# Patient Record
Sex: FEMALE | Race: WHITE | NOT HISPANIC OR LATINO | Employment: FULL TIME | URBAN - METROPOLITAN AREA
[De-identification: names, ages, dates, MRNs, and addresses within clinical notes are randomized per-mention and may not be internally consistent; named-entity substitution may affect disease eponyms.]

---

## 2017-09-25 ENCOUNTER — HOSPITAL ENCOUNTER (EMERGENCY)
Facility: HOSPITAL | Age: 47
Discharge: HOME | End: 2017-09-25
Attending: FAMILY MEDICINE | Admitting: FAMILY MEDICINE

## 2017-09-25 VITALS
HEIGHT: 64 IN | HEART RATE: 104 BPM | DIASTOLIC BLOOD PRESSURE: 88 MMHG | BODY MASS INDEX: 18.78 KG/M2 | RESPIRATION RATE: 19 BRPM | WEIGHT: 110 LBS | SYSTOLIC BLOOD PRESSURE: 126 MMHG | OXYGEN SATURATION: 100 % | TEMPERATURE: 97.2 F

## 2017-09-25 DIAGNOSIS — H16.001 CORNEAL ULCER, RIGHT: Primary | ICD-10-CM

## 2017-09-25 DIAGNOSIS — H57.89 CONTACT LENS STUCK: ICD-10-CM

## 2017-09-25 PROCEDURE — 99283 EMERGENCY DEPT VISIT LOW MDM: CPT

## 2017-09-25 RX ORDER — TETRACAINE HYDROCHLORIDE 5 MG/ML
2 SOLUTION OPHTHALMIC ONCE
Status: COMPLETED | OUTPATIENT
Start: 2017-09-25 | End: 2017-09-25

## 2017-09-25 RX ADMIN — TETRACAINE HYDROCHLORIDE 2 DROP: 5 SOLUTION OPHTHALMIC at 12:26

## 2017-09-25 RX ADMIN — FLUORESCEIN SODIUM 1 STRIP: 1 STRIP OPHTHALMIC at 12:26

## 2018-10-03 ENCOUNTER — HOSPITAL ENCOUNTER (EMERGENCY)
Facility: HOSPITAL | Age: 48
Discharge: HOME | End: 2018-10-03
Attending: EMERGENCY MEDICINE | Admitting: EMERGENCY MEDICINE

## 2018-10-03 VITALS
HEART RATE: 98 BPM | SYSTOLIC BLOOD PRESSURE: 133 MMHG | DIASTOLIC BLOOD PRESSURE: 81 MMHG | RESPIRATION RATE: 18 BRPM | HEIGHT: 63 IN | TEMPERATURE: 98.2 F | OXYGEN SATURATION: 99 %

## 2018-10-03 DIAGNOSIS — J34.89 NASAL SEPTUM PERFORATION: Primary | ICD-10-CM

## 2018-10-03 DIAGNOSIS — J98.01 BRONCHOSPASM: ICD-10-CM

## 2018-10-03 PROCEDURE — 99282 EMERGENCY DEPT VISIT SF MDM: CPT

## 2018-10-03 RX ORDER — ALBUTEROL SULFATE 90 UG/1
2 AEROSOL, METERED RESPIRATORY (INHALATION) EVERY 4 HOURS PRN
Qty: 1 INHALER | Refills: 0 | Status: SHIPPED | OUTPATIENT
Start: 2018-10-03 | End: 2019-10-23 | Stop reason: SDUPTHER

## 2018-10-03 NOTE — ED PROVIDER NOTES
EMERGENCY DEPARTMENT ENCOUNTER    CHIEF COMPLAINT  Chief Complaint: Difficulty Breathing   History given by: Pt  History limited by: none  Room Number: 40/40  PMD: Melody Mary MD      HPI:  Pt is a 48 y.o. female who presents complaining of coughing and choking spells.  This is been going on for some time, however she seems to think it always starts with a cough.  Sometimes she'll have issues after eating certain types of food. Pt confirms cough productive of thick/clear mucus, ear pain, headache.  Pt has a cocaine addiction, but states that she has been clean for 6 months.     Duration:  Couple days ago   Onset: gradual   Timing: intermittent episodes   Location: throat, ear, head  Radiation: none  Quality: pain  Intensity/Severity: moderate  Progression: unchanged   Associated Symptoms: clear cough, ear pain, headache, thick clear mucus  Aggravating Factors: Cough initiates the difficulty of breathing.   Alleviating Factors: none  Previous Episodes: none stated  Treatment before arrival: none stated     PAST MEDICAL HISTORY  Active Ambulatory Problems     Diagnosis Date Noted   • No Active Ambulatory Problems     Resolved Ambulatory Problems     Diagnosis Date Noted   • No Resolved Ambulatory Problems     Past Medical History:   Diagnosis Date   • Hyperlipidemia        PAST SURGICAL HISTORY  Past Surgical History:   Procedure Laterality Date   • HYSTERECTOMY     • LAPAROTOMY OOPHERECTOMY Right        FAMILY HISTORY  History reviewed. No pertinent family history.    SOCIAL HISTORY  Social History     Social History   • Marital status:      Spouse name: N/A   • Number of children: N/A   • Years of education: N/A     Occupational History   • Not on file.     Social History Main Topics   • Smoking status: Current Every Day Smoker     Packs/day: 1.00     Types: Cigarettes   • Smokeless tobacco: Not on file   • Alcohol use Yes      Comment: social   • Drug use: Yes     Types: Cocaine      Comment: clean  from cocaine x6 months   • Sexual activity: Not on file     Other Topics Concern   • Not on file     Social History Narrative   • No narrative on file       ALLERGIES  Morphine and related    REVIEW OF SYSTEMS  Review of Systems   Constitutional: Negative for fever.   HENT: Positive for ear pain. Negative for sore throat.         Thick, clear mucus.    Eyes: Negative.    Respiratory: Positive for cough. Negative for shortness of breath.         Difficulty breathing.    Cardiovascular: Negative for chest pain.   Gastrointestinal: Negative for abdominal pain, diarrhea and vomiting.   Genitourinary: Negative for dysuria.   Musculoskeletal: Negative for neck pain.   Skin: Negative for rash.   Allergic/Immunologic: Negative.    Neurological: Positive for headaches. Negative for weakness and numbness.   Hematological: Negative.    Psychiatric/Behavioral: Negative.    All other systems reviewed and are negative.      PHYSICAL EXAM  ED Triage Vitals   Temp Heart Rate Resp BP SpO2   10/03/18 1320 10/03/18 1320 10/03/18 1320 10/03/18 1330 10/03/18 1320   98.2 °F (36.8 °C) 108 18 133/81 99 %      Temp src Heart Rate Source Patient Position BP Location FiO2 (%)   -- -- -- -- --              Physical Exam   Constitutional: She is oriented to person, place, and time. No distress.   HENT:   Head: Normocephalic and atraumatic.   Right Ear: External ear normal.   Left Ear: External ear normal.   Mouth/Throat: Oropharynx is clear and moist.   Chronic appearing nasal septum perforation.       Eyes: Pupils are equal, round, and reactive to light. EOM are normal.   Neck: Normal range of motion. Neck supple.   Cardiovascular: Normal rate, regular rhythm and normal heart sounds.    Pulmonary/Chest: Effort normal and breath sounds normal. No respiratory distress.   Abdominal: Soft. There is no tenderness. There is no rebound and no guarding.   Musculoskeletal: Normal range of motion. She exhibits no edema.   Neurological: She is alert and  oriented to person, place, and time. She has normal sensation and normal strength.   Skin: Skin is warm and dry. No rash noted.   Psychiatric: Mood and affect normal.   Nursing note and vitals reviewed.          PROCEDURES  Procedures      PROGRESS AND CONSULTS  ED Course as of Oct 03 1417   Wed Oct 03, 2018   1346 Nasal pain since dec 2016  [KG]      ED Course User Index  [KG] Nisha Lauren, APRN     1411  She has a chronic appearing nasal septal perforation.  She also has symptoms of chronic sinusitis-I have advised her to start using over-the-counter Flonase.  I'll also prescribe her albuterol for bronchospasm.  She will need a follow-up with ENT to further discuss treatment options for the nasal septal perforation.  Discussed plan to discharge pt. Pt understands and agrees with the plan, all questions answered.        MEDICAL DECISION MAKING  Results were reviewed/discussed with the patient and they were also made aware of online access. Pt also made aware that some labs, such as cultures, will not be resulted during ER visit and follow up with PMD is necessary.     MDM  Number of Diagnoses or Management Options  Bronchospasm:   Nasal septum perforation:      Amount and/or Complexity of Data Reviewed  Decide to obtain previous medical records or to obtain history from someone other than the patient: yes  Review and summarize past medical records: yes           DIAGNOSIS  Final diagnoses:   Nasal septum perforation   Bronchospasm       DISPOSITION  DISCHARGE    Patient discharged in stable condition.    Reviewed implications of results, diagnosis, meds, responsibility to follow up, warning signs and symptoms of possible worsening, potential complications and reasons to return to ER.    Patient/Family voiced understanding of above instructions.    Discussed plan for discharge, as there is no emergent indication for admission. Patient referred to primary care provider for BP management due to today's BP.  Pt/family is agreeable and understands need for follow up and repeat testing.  Pt is aware that discharge does not mean that nothing is wrong but it indicates no emergency is present that requires admission and they must continue care with follow-up as given below or physician of their choice.     FOLLOW-UP  Leeroy Roche MD  [address]  [facility] [id number] [phone]    Schedule an appointment as soon as possible for a visit       Melody Mary MD  [address] [facility] [id number] [phone]    Schedule an appointment as soon as possible for a visit            Medication List      New Prescriptions    albuterol 108 (90 Base) MCG/ACT inhaler  Commonly known as:  PROVENTIL HFA;VENTOLIN HFA  Inhale 2 puffs Every 4 (Four) Hours As Needed for Wheezing.              Latest Documented Vital Signs:  As of 2:17 PM  BP- 133/81 HR- 98 Temp- 98.2 °F (36.8 °C) O2 sat- 99%    --  Documentation assistance provided by maureen Plascencia for Dr Garsia.  Information recorded by the scribe was done at my direction and has been verified and validated by me.     Luanne Candelario  10/03/18 1412       Osiel Garsia MD  10/03/18 [id number]

## 2018-10-13 ENCOUNTER — HOSPITAL ENCOUNTER (EMERGENCY)
Facility: HOSPITAL | Age: 48
Discharge: HOME | End: 2018-10-13
Attending: EMERGENCY MEDICINE | Admitting: EMERGENCY MEDICINE

## 2018-10-13 ENCOUNTER — APPOINTMENT (OUTPATIENT)
Dept: GENERAL RADIOLOGY | Facility: HOSPITAL | Age: 48
End: 2018-10-13

## 2018-10-13 VITALS
OXYGEN SATURATION: 94 % | TEMPERATURE: 99 F | DIASTOLIC BLOOD PRESSURE: 74 MMHG | SYSTOLIC BLOOD PRESSURE: 114 MMHG | WEIGHT: 178.2 LBS | HEIGHT: 62 IN | RESPIRATION RATE: 18 BRPM | HEART RATE: 90 BPM | BODY MASS INDEX: 32.79 KG/M2

## 2018-10-13 DIAGNOSIS — J06.9 UPPER RESPIRATORY TRACT INFECTION, UNSPECIFIED TYPE: ICD-10-CM

## 2018-10-13 DIAGNOSIS — J20.9 ACUTE BRONCHITIS WITH BRONCHOSPASM: Primary | ICD-10-CM

## 2018-10-13 LAB
ALBUMIN SERPL-MCNC: 4.3 G/DL (ref 3.5–5.2)
ALBUMIN/GLOB SERPL: 1.3 G/DL
ALP SERPL-CCNC: 123 U/L (ref 39–117)
ALT SERPL W P-5'-P-CCNC: 57 U/L (ref 1–33)
ANION GAP SERPL CALCULATED.3IONS-SCNC: 15.7 MMOL/L
AST SERPL-CCNC: 91 U/L (ref 1–32)
BASOPHILS # BLD AUTO: 0.03 10*3/MM3 (ref 0–0.2)
BASOPHILS NFR BLD AUTO: 0.3 % (ref 0–1.5)
BILIRUB SERPL-MCNC: 0.3 MG/DL (ref 0.1–1.2)
BUN BLD-MCNC: 10 MG/DL (ref 6–20)
BUN/CREAT SERPL: 10.9 (ref 7–25)
CALCIUM SPEC-SCNC: 8.9 MG/DL (ref 8.6–10.5)
CHLORIDE SERPL-SCNC: 96 MMOL/L (ref 98–107)
CO2 SERPL-SCNC: 23.3 MMOL/L (ref 22–29)
CREAT BLD-MCNC: 0.92 MG/DL (ref 0.57–1)
DEPRECATED RDW RBC AUTO: 44.7 FL (ref 37–54)
EOSINOPHIL # BLD AUTO: 0 10*3/MM3 (ref 0–0.7)
EOSINOPHIL NFR BLD AUTO: 0 % (ref 0.3–6.2)
ERYTHROCYTE [DISTWIDTH] IN BLOOD BY AUTOMATED COUNT: 13.5 % (ref 11.7–13)
FLUAV AG NPH QL: NEGATIVE
FLUBV AG NPH QL IA: NEGATIVE
GFR SERPL CREATININE-BSD FRML MDRD: 65 ML/MIN/1.73
GLOBULIN UR ELPH-MCNC: 3.3 GM/DL
GLUCOSE BLD-MCNC: 107 MG/DL (ref 65–99)
HCT VFR BLD AUTO: 38.3 % (ref 35.6–45.5)
HGB BLD-MCNC: 13.2 G/DL (ref 11.9–15.5)
IMM GRANULOCYTES # BLD: 0.04 10*3/MM3 (ref 0–0.03)
IMM GRANULOCYTES NFR BLD: 0.4 % (ref 0–0.5)
LYMPHOCYTES # BLD AUTO: 1.4 10*3/MM3 (ref 0.9–4.8)
LYMPHOCYTES NFR BLD AUTO: 15.3 % (ref 19.6–45.3)
MCH RBC QN AUTO: 31.1 PG (ref 26.9–32)
MCHC RBC AUTO-ENTMCNC: 34.5 G/DL (ref 32.4–36.3)
MCV RBC AUTO: 90.1 FL (ref 80.5–98.2)
MONOCYTES # BLD AUTO: 0.48 10*3/MM3 (ref 0.2–1.2)
MONOCYTES NFR BLD AUTO: 5.2 % (ref 5–12)
NEUTROPHILS # BLD AUTO: 7.25 10*3/MM3 (ref 1.9–8.1)
NEUTROPHILS NFR BLD AUTO: 79.2 % (ref 42.7–76)
PLATELET # BLD AUTO: 271 10*3/MM3 (ref 140–500)
PMV BLD AUTO: 9.9 FL (ref 6–12)
POTASSIUM BLD-SCNC: 3.4 MMOL/L (ref 3.5–5.2)
PROT SERPL-MCNC: 7.6 G/DL (ref 6–8.5)
RBC # BLD AUTO: 4.25 10*6/MM3 (ref 3.9–5.2)
SODIUM BLD-SCNC: 135 MMOL/L (ref 136–145)
WBC NRBC COR # BLD: 9.16 10*3/MM3 (ref 4.5–10.7)

## 2018-10-13 PROCEDURE — 87804 INFLUENZA ASSAY W/OPTIC: CPT | Performed by: NURSE PRACTITIONER

## 2018-10-13 PROCEDURE — 85025 COMPLETE CBC W/AUTO DIFF WBC: CPT | Performed by: NURSE PRACTITIONER

## 2018-10-13 PROCEDURE — 99284 EMERGENCY DEPT VISIT MOD MDM: CPT

## 2018-10-13 PROCEDURE — 80053 COMPREHEN METABOLIC PANEL: CPT | Performed by: NURSE PRACTITIONER

## 2018-10-13 PROCEDURE — 71046 X-RAY EXAM CHEST 2 VIEWS: CPT

## 2018-10-13 PROCEDURE — 94799 UNLISTED PULMONARY SVC/PX: CPT

## 2018-10-13 PROCEDURE — 94640 AIRWAY INHALATION TREATMENT: CPT

## 2018-10-13 RX ORDER — METHYLPREDNISOLONE SODIUM SUCCINATE 125 MG/2ML
125 INJECTION, POWDER, LYOPHILIZED, FOR SOLUTION INTRAMUSCULAR; INTRAVENOUS ONCE
Status: DISCONTINUED | OUTPATIENT
Start: 2018-10-13 | End: 2018-10-13

## 2018-10-13 RX ORDER — HYDROXYZINE HYDROCHLORIDE 25 MG/1
25 TABLET, FILM COATED ORAL EVERY 6 HOURS PRN
COMMUNITY
End: 2019-10-23 | Stop reason: SDUPTHER

## 2018-10-13 RX ORDER — LORAZEPAM 0.5 MG/1
0.5 TABLET ORAL NIGHTLY
COMMUNITY
End: 2019-10-23

## 2018-10-13 RX ORDER — PREDNISONE 20 MG/1
20 TABLET ORAL 2 TIMES DAILY
Qty: 10 TABLET | Refills: 0 | Status: SHIPPED | OUTPATIENT
Start: 2018-10-13 | End: 2019-10-23

## 2018-10-13 RX ORDER — IPRATROPIUM BROMIDE AND ALBUTEROL SULFATE 2.5; .5 MG/3ML; MG/3ML
3 SOLUTION RESPIRATORY (INHALATION) ONCE
Status: COMPLETED | OUTPATIENT
Start: 2018-10-13 | End: 2018-10-13

## 2018-10-13 RX ORDER — ESCITALOPRAM OXALATE 20 MG/1
20 TABLET ORAL DAILY
COMMUNITY
End: 2019-10-21 | Stop reason: SDUPTHER

## 2018-10-13 RX ORDER — ALBUTEROL SULFATE 2.5 MG/3ML
2.5 SOLUTION RESPIRATORY (INHALATION) ONCE
Status: COMPLETED | OUTPATIENT
Start: 2018-10-13 | End: 2018-10-13

## 2018-10-13 RX ORDER — IBUPROFEN 600 MG/1
600 TABLET ORAL EVERY 8 HOURS PRN
Qty: 21 TABLET | Refills: 0 | Status: SHIPPED | OUTPATIENT
Start: 2018-10-13 | End: 2019-10-23

## 2018-10-13 RX ADMIN — IPRATROPIUM BROMIDE AND ALBUTEROL SULFATE 3 ML: .5; 3 SOLUTION RESPIRATORY (INHALATION) at 15:00

## 2018-10-13 RX ADMIN — ALBUTEROL SULFATE 2.5 MG: 2.5 SOLUTION RESPIRATORY (INHALATION) at 16:38

## 2018-10-13 NOTE — DISCHARGE INSTRUCTIONS
Use medications as directed and continue the home ventolin inhaler.  Use over the counter Mucinex as needed for congestion or cough.

## 2018-10-13 NOTE — ED PROVIDER NOTES
EMERGENCY DEPARTMENT ENCOUNTER    CHIEF COMPLAINT  Chief Complaint: cough  History given by: patient  History limited by: none  Room Number: 18/18  PMD: Melody Mary MD      HPI:  Pt is a 48 y.o. female who presents complaining of persistent cough (white sputum), wheezing, chest wall pain, chills, subjective fever, arthralgias, and myalgias for the past 4 days. She also c/o n/v/d. Pt advised that her mother had the same sx. Pt denies hx of asthma and COPD. Pt is a smoker and has not used cocaine or ETOH for the past 6 months.     Duration:  4 days  Onset: gradual  Timing: persistent  Location: n/a  Radiation: n/a  Quality: productive to nonproductive  Intensity/Severity: moderate  Progression: unchanged  Associated Symptoms: wheezing, chest wall pain, subjective fever, arthralgias, myalgias, n/v/d  Aggravating Factors: none  Alleviating Factors: none  Previous Episodes: none  Treatment before arrival: none    PAST MEDICAL HISTORY  Active Ambulatory Problems     Diagnosis Date Noted   • No Active Ambulatory Problems     Resolved Ambulatory Problems     Diagnosis Date Noted   • No Resolved Ambulatory Problems     Past Medical History:   Diagnosis Date   • Anxiety    • Drug abuse in remission    • Hyperlipidemia        PAST SURGICAL HISTORY  Past Surgical History:   Procedure Laterality Date   • HYSTERECTOMY     • LAPAROTOMY OOPHERECTOMY Right        FAMILY HISTORY  History reviewed. No pertinent family history.    SOCIAL HISTORY  Social History     Social History   • Marital status:      Spouse name: N/A   • Number of children: N/A   • Years of education: N/A     Occupational History   • Not on file.     Social History Main Topics   • Smoking status: Current Every Day Smoker     Packs/day: 1.00     Types: Cigarettes   • Smokeless tobacco: Not on file   • Alcohol use Yes      Comment: social   • Drug use: Yes     Types: Cocaine      Comment: clean from cocaine x6 months   • Sexual activity: Not on file      Other Topics Concern   • Not on file     Social History Narrative   • No narrative on file       ALLERGIES  Morphine and related    REVIEW OF SYSTEMS  Review of Systems   Constitutional: Positive for fever (subjective).   HENT: Negative for sore throat.    Eyes: Negative.    Respiratory: Positive for cough, shortness of breath and wheezing.    Cardiovascular: Negative for chest pain.   Gastrointestinal: Positive for diarrhea, nausea and vomiting. Negative for abdominal pain.   Genitourinary: Negative for dysuria.   Musculoskeletal: Positive for arthralgias and myalgias. Negative for neck pain.   Skin: Negative for rash.   Allergic/Immunologic: Negative.    Neurological: Negative for weakness, numbness and headaches.   Hematological: Negative.    Psychiatric/Behavioral: Negative.    All other systems reviewed and are negative.      PHYSICAL EXAM  ED Triage Vitals   Temp Heart Rate Resp BP SpO2   10/13/18 1422 10/13/18 1423 10/13/18 1500 10/13/18 1517 10/13/18 1423   99.5 °F (37.5 °C) 100 20 134/85 95 %      Temp src Heart Rate Source Patient Position BP Location FiO2 (%)   10/13/18 1422 -- -- -- --   Tympanic           Physical Exam   Constitutional: She is oriented to person, place, and time. She appears distressed (mild).   HENT:   Head: Normocephalic and atraumatic.   Eyes: Pupils are equal, round, and reactive to light. EOM are normal.   Neck: Normal range of motion. Neck supple.   Cardiovascular: Normal rate, regular rhythm and normal heart sounds.    No murmur heard.  Pulmonary/Chest: She is in respiratory distress (mild). She has no wheezes. She has rhonchi (occasional, right base).   92% on RA, chest wall tenderness   Abdominal: Soft. There is no tenderness. There is no rebound and no guarding.   Musculoskeletal: Normal range of motion. She exhibits no edema.   Neurological: She is alert and oriented to person, place, and time. She has normal sensation and normal strength.   Skin: Skin is warm and dry. No  rash noted.   Psychiatric: Mood and affect normal.   Nursing note and vitals reviewed.      LAB RESULTS  Lab Results (last 24 hours)     Procedure Component Value Units Date/Time    Influenza Antigen, Rapid - Swab, Nasopharynx [[id number]]  (Normal) Collected:  10/13/18 1518    Specimen:  Swab from Nasopharynx Updated:  10/13/18 1547     Influenza A Ag, EIA Negative     Influenza B Ag, EIA Negative    CBC & Differential [[id number]] Collected:  10/13/18 1521    Specimen:  Blood Updated:  10/13/18 1551    Narrative:       The following orders were created for panel order CBC & Differential.  Procedure                               Abnormality         Status                     ---------                               -----------         ------                     CBC Auto Differential[[id number]]        Abnormal            Final result                 Please view results for these tests on the individual orders.    Comprehensive Metabolic Panel [[id number]]  (Abnormal) Collected:  10/13/18 1521    Specimen:  Blood Updated:  10/13/18 1608     Glucose 107 (H) mg/dL      BUN 10 mg/dL      Creatinine 0.92 mg/dL      Sodium 135 (L) mmol/L      Potassium 3.4 (L) mmol/L      Chloride 96 (L) mmol/L      CO2 23.3 mmol/L      Calcium 8.9 mg/dL      Total Protein 7.6 g/dL      Albumin 4.30 g/dL      ALT (SGPT) 57 (H) U/L      AST (SGOT) 91 (H) U/L      Alkaline Phosphatase 123 (H) U/L      Total Bilirubin 0.3 mg/dL      eGFR Non African Amer 65 mL/min/1.73      Globulin 3.3 gm/dL      A/G Ratio 1.3 g/dL      BUN/Creatinine Ratio 10.9     Anion Gap 15.7 mmol/L     CBC Auto Differential [[id number]]  (Abnormal) Collected:  10/13/18 1521    Specimen:  Blood Updated:  10/13/18 1551     WBC 9.16 10*3/mm3      RBC 4.25 10*6/mm3      Hemoglobin 13.2 g/dL      Hematocrit 38.3 %      MCV 90.1 fL      MCH 31.1 pg      MCHC 34.5 g/dL      RDW 13.5 (H) %      RDW-SD 44.7 fl      MPV 9.9 fL      Platelets 271 10*3/mm3      Neutrophil % 79.2 (H) %       Lymphocyte % 15.3 (L) %      Monocyte % 5.2 %      Eosinophil % 0.0 (L) %      Basophil % 0.3 %      Immature Grans % 0.4 %      Neutrophils, Absolute 7.25 10*3/mm3      Lymphocytes, Absolute 1.40 10*3/mm3      Monocytes, Absolute 0.48 10*3/mm3      Eosinophils, Absolute 0.00 10*3/mm3      Basophils, Absolute 0.03 10*3/mm3      Immature Grans, Absolute 0.04 (H) 10*3/mm3           I ordered the above labs and reviewed the results    RADIOLOGY  XR Chest 2 View   Final Result   No evidence for acute pulmonary process. Follow-up as   clinical indications persist.       This report was finalized on 10/13/2018 4:07 PM by Dr. Angel Robison M.D.               I ordered the above noted radiological studies. Interpreted by radiologist. Reviewed by me in PACS.       PROCEDURES  Procedures      PROGRESS AND CONSULTS  ED Course as of Oct 13 1716   Sat Oct 13, 2018   1447 Cough chills myalgia x 4 days. +Tobacco user   [JS]      ED Course User Index  [JS] Mony Lara, APRN   1622  Discussed w/ the pt that her imaging shows no signs of PNA and her labs are negative for influenza. Pt told that sx are likely viral and the plan to administer steroids and breathing treatments. Pt advised since receiving the breathing treatment she feels improved, but not baseline.    1649  Observed pt ambulating well within the ED w/o difficulty or distressed.    1702  BP- 134/85 HR- 98 Temp- 99.5 °F (37.5 °C) (Tympanic) O2 sat- 93%  Rechecked the patient who is in NAD and is resting comfortably. Pt advised she would like to abdomen reassessed. Pt was talking while palpating abdomen w/o evidence of pain or distress. Lungs are now CTAB. Discussed w/ pt the plan to d/c w/ her to f/u as needed w/ her PCP. Pt understands and agrees with the plan, all questions answered.    MEDICAL DECISION MAKING  Results were reviewed/discussed with the patient and they were also made aware of online access. Pt also made aware that some labs, such as  cultures, will not be resulted during ER visit and follow up with PMD is necessary.     MDM  Number of Diagnoses or Management Options     Amount and/or Complexity of Data Reviewed  Clinical lab tests: (Negative influenza)  Tests in the radiology section of CPT®: reviewed (CXR-negative)  Independent visualization of images, tracings, or specimens: yes    Patient Progress  Patient progress: stable         DIAGNOSIS  Final diagnoses:   Acute bronchitis with bronchospasm   Upper respiratory tract infection, unspecified type       DISPOSITION  DISCHARGE    Patient discharged in stable condition.    Reviewed implications of results, diagnosis, meds, responsibility to follow up, warning signs and symptoms of possible worsening, potential complications and reasons to return to ER.    Patient/Family voiced understanding of above instructions.    Discussed plan for discharge, as there is no emergent indication for admission. Patient referred to primary care provider for BP management due to today's BP. Pt/family is agreeable and understands need for follow up and repeat testing.  Pt is aware that discharge does not mean that nothing is wrong but it indicates no emergency is present that requires admission and they must continue care with follow-up as given below or physician of their choice.     FOLLOW-UP  Melody Mary MD  [address] [id number] [phone]    Schedule an appointment as soon as possible for a visit            Medication List      New Prescriptions    ibuprofen 600 MG tablet  Commonly known as:  ADVIL,MOTRIN  Take 1 tablet by mouth Every 8 (Eight) Hours As Needed for Moderate Pain .        Changed    * predniSONE 20 MG tablet  Commonly known as:  DELTASONE  Take 1 tablet by mouth Daily.  What changed:  Another medication with the same name was added. Make sure   you understand how and when to take each.     * predniSONE 20 MG tablet  Commonly known as:  DELTASONE  Take 1 tablet by  mouth 2 (Two) Times a Day.  What changed:  You were already taking a medication with the same name,   and this prescription was added. Make sure you understand how and when to   take each.        * This list has 2 medication(s) that are the same as other medications   prescribed for you. Read the directions carefully, and ask your doctor or   other care provider to review them with you.                Latest Documented Vital Signs:  As of 5:16 PM  BP- 114/74 HR- 90 Temp- 99 °F (37.2 °C) (Oral) O2 sat- 94%    --  Documentation assistance provided by maureen Jacobs for Dr. Wadsworth.  Information recorded by the scribe was done at my direction and has been verified and validated by me.     Daisy Jacobs  10/13/18 7556       Nile Wadsworth MD  10/13/18 4536

## 2019-10-16 ENCOUNTER — TELEPHONE (OUTPATIENT)
Dept: FAMILY MEDICINE CLINIC | Facility: CLINIC | Age: 49
End: 2019-10-16

## 2019-10-16 NOTE — TELEPHONE ENCOUNTER
Pt is requesting a refill on the following medication  Lexapro 20mg 1QD  Atorvastatin 20mg 1QD   Hydroxyzine 25mg 1 QID  Mirtazapine 30mg 1QD    Next appt 10/23/19

## 2019-10-21 DIAGNOSIS — F41.9 ANXIETY: Primary | ICD-10-CM

## 2019-10-21 RX ORDER — ESCITALOPRAM OXALATE 20 MG/1
20 TABLET ORAL DAILY
Qty: 30 TABLET | Refills: 0 | Status: SHIPPED | OUTPATIENT
Start: 2019-10-21 | End: 2019-10-23 | Stop reason: SDUPTHER

## 2019-10-23 ENCOUNTER — OFFICE VISIT (OUTPATIENT)
Dept: FAMILY MEDICINE CLINIC | Facility: CLINIC | Age: 49
End: 2019-10-23

## 2019-10-23 VITALS
TEMPERATURE: 98 F | SYSTOLIC BLOOD PRESSURE: 134 MMHG | OXYGEN SATURATION: 95 % | WEIGHT: 185 LBS | HEART RATE: 104 BPM | DIASTOLIC BLOOD PRESSURE: 90 MMHG | BODY MASS INDEX: 31.58 KG/M2 | HEIGHT: 64 IN

## 2019-10-23 DIAGNOSIS — F41.9 ANXIETY: ICD-10-CM

## 2019-10-23 DIAGNOSIS — F41.8 DEPRESSION WITH ANXIETY: ICD-10-CM

## 2019-10-23 DIAGNOSIS — F51.01 PRIMARY INSOMNIA: ICD-10-CM

## 2019-10-23 DIAGNOSIS — J30.1 SEASONAL ALLERGIC RHINITIS DUE TO POLLEN: ICD-10-CM

## 2019-10-23 DIAGNOSIS — E78.2 MIXED HYPERLIPIDEMIA: Primary | ICD-10-CM

## 2019-10-23 PROCEDURE — 99214 OFFICE O/P EST MOD 30 MIN: CPT | Performed by: FAMILY MEDICINE

## 2019-10-23 RX ORDER — ESCITALOPRAM OXALATE 20 MG/1
20 TABLET ORAL DAILY
Qty: 90 TABLET | Refills: 3 | Status: SHIPPED | OUTPATIENT
Start: 2019-10-23 | End: 2021-01-15

## 2019-10-23 RX ORDER — MONTELUKAST SODIUM 10 MG/1
10 TABLET ORAL NIGHTLY
Qty: 90 TABLET | Refills: 1 | Status: SHIPPED | OUTPATIENT
Start: 2019-10-23 | End: 2020-04-22 | Stop reason: SDUPTHER

## 2019-10-23 RX ORDER — ATORVASTATIN CALCIUM 20 MG/1
20 TABLET, FILM COATED ORAL DAILY
Qty: 90 TABLET | Refills: 3 | Status: SHIPPED | OUTPATIENT
Start: 2019-10-23 | End: 2021-01-18 | Stop reason: SDUPTHER

## 2019-10-23 RX ORDER — MIRTAZAPINE 30 MG/1
30 TABLET, FILM COATED ORAL DAILY
Qty: 90 TABLET | Refills: 3 | Status: SHIPPED | OUTPATIENT
Start: 2019-10-23 | End: 2021-01-15

## 2019-10-23 RX ORDER — CETIRIZINE HYDROCHLORIDE 10 MG/1
10 TABLET ORAL DAILY
Qty: 90 TABLET | Refills: 1 | Status: SHIPPED | OUTPATIENT
Start: 2019-10-23 | End: 2020-05-14

## 2019-10-23 RX ORDER — MIRTAZAPINE 30 MG/1
1 TABLET, FILM COATED ORAL DAILY
COMMUNITY
Start: 2019-10-16 | End: 2019-10-23 | Stop reason: SDUPTHER

## 2019-10-23 RX ORDER — HYDROXYZINE HYDROCHLORIDE 25 MG/1
25 TABLET, FILM COATED ORAL EVERY 6 HOURS PRN
Qty: 90 TABLET | Refills: 3 | Status: SHIPPED | OUTPATIENT
Start: 2019-10-23 | End: 2021-01-15

## 2019-10-23 RX ORDER — ALBUTEROL SULFATE 90 UG/1
2 AEROSOL, METERED RESPIRATORY (INHALATION) EVERY 4 HOURS PRN
Qty: 1 INHALER | Refills: 0 | Status: SHIPPED | OUTPATIENT
Start: 2019-10-23 | End: 2020-04-07 | Stop reason: SDUPTHER

## 2019-10-23 NOTE — PROGRESS NOTES
Michelle Williamson is a 49 y.o. female.     Chief Complaint   Patient presents with   • Anxiety     Follow up   • Cough     x 2 weeks        History of Present Illness   Anxiety/depression- moods are good. No SE of med except weight gain. Still seeing therapist.     hld- taking meds and due labs, no muscle aches    Insomnia- Does well if she has her remeron    Drug abuse- has done cocaine in the past. Still clean. For over a year.     Allergies for 2 weeks, wanting to restart meds. Has every fall.     The following portions of the patient's history were reviewed and updated as appropriate: allergies, current medications, past family history, past medical history, past social history, past surgical history and problem list.    Past Medical History:   Diagnosis Date   • Acute bronchitis    • Allergic rhinitis    • Anxiety    • Current every day smoker     smokes 1 to 2 packs per day for 15 years-currently uses other tobacco products   • Depression with anxiety    • Drug abuse in remission ([facility])    • High cholesterol    • History of depression    • Hyperlipidemia    • Insect bite    • Insect bite of leg    • Insomnia    • Nasal septal deviation    • Tobacco use        Past Surgical History:   Procedure Laterality Date   • COLONOSCOPY  2013   • HEMORRHOIDECTOMY     • HYSTERECTOMY     • LAPAROTOMY OOPHERECTOMY Right        Family History   Problem Relation Age of Onset   • Other Mother         Amyloidosis   • Anemia Mother    • Arthritis Mother    • Hyperlipidemia Mother    • Osteoporosis Mother    • Esophageal cancer Father    • Hyperlipidemia Father    • Breast cancer Maternal Grandmother    • Emphysema Maternal Grandfather    • No Known Problems Other        Social History     Socioeconomic History   • Marital status:      Spouse name: Not on file   • Number of children: Not on file   • Years of education: Not on file   • Highest education level: Not on file   Tobacco Use   • Smoking status:  "Current Every Day Smoker     Packs/day: 1.00     Types: Cigarettes   • Smokeless tobacco: Never Used   • Tobacco comment: smokes 1 to 2 packs per day for 15 years, currently uses other tobacco products   Substance and Sexual Activity   • Alcohol use: Yes     Comment: social   • Drug use: Yes     Types: Cocaine     Comment: clean from cocaine x6 months       Review of Systems   Respiratory: Negative for shortness of breath.    Cardiovascular: Negative for chest pain.       Objective   Visit Vitals  /90   Pulse 104   Temp 98 °F (36.7 °C) (Temporal)   Ht 162.6 cm (64\")   Wt 83.9 kg (185 lb)   SpO2 95%   BMI 31.76 kg/m²     Body mass index is 31.76 kg/m².  Physical Exam   Constitutional: She is oriented to person, place, and time. She appears well-developed and well-nourished.   Cardiovascular: Normal rate, regular rhythm, normal heart sounds and intact distal pulses.   Pulmonary/Chest: Effort normal and breath sounds normal.   Musculoskeletal: Normal range of motion. She exhibits no edema.   Neurological: She is alert and oriented to person, place, and time.   Skin: Skin is warm and dry.   Psychiatric: She has a normal mood and affect. Her behavior is normal.         Assessment/Plan   Joelle was seen today for anxiety and cough.    Diagnoses and all orders for this visit:    Mixed hyperlipidemia  -     Lipid Panel  -     Comprehensive Metabolic Panel  -     atorvastatin (LIPITOR) 20 MG tablet; Take 1 tablet by mouth Daily.  -     CBC & Differential    Primary insomnia  -     CBC & Differential  -     mirtazapine (REMERON) 30 MG tablet; Take 1 tablet by mouth Daily.    Depression with anxiety  -     escitalopram (LEXAPRO) 20 MG tablet; Take 1 tablet by mouth Daily.  -     hydrOXYzine (ATARAX) 25 MG tablet; Take 1 tablet by mouth Every 6 (Six) Hours As Needed for Anxiety.    Anxiety    Seasonal allergic rhinitis due to pollen  -     albuterol sulfate  (90 Base) MCG/ACT inhaler; Inhale 2 puffs Every 4 (Four) " Hours As Needed for Wheezing.  -     montelukast (SINGULAIR) 10 MG tablet; Take 1 tablet by mouth Every Night.  -     cetirizine (zyrTEC) 10 MG tablet; Take 1 tablet by mouth Daily.    Cont meds, f/u in 6 months and if worse or no better.

## 2019-10-24 LAB
ALBUMIN SERPL-MCNC: 4.6 G/DL (ref 3.5–5.2)
ALBUMIN/GLOB SERPL: 1.9 G/DL
ALP SERPL-CCNC: 160 U/L (ref 39–117)
ALT SERPL-CCNC: 54 U/L (ref 1–33)
AST SERPL-CCNC: 32 U/L (ref 1–32)
BASOPHILS # BLD AUTO: 0.09 10*3/MM3 (ref 0–0.2)
BASOPHILS NFR BLD AUTO: 1 % (ref 0–1.5)
BILIRUB SERPL-MCNC: 0.5 MG/DL (ref 0.2–1.2)
BUN SERPL-MCNC: 9 MG/DL (ref 6–20)
BUN/CREAT SERPL: 12.7 (ref 7–25)
CALCIUM SERPL-MCNC: 9.6 MG/DL (ref 8.6–10.5)
CHLORIDE SERPL-SCNC: 100 MMOL/L (ref 98–107)
CHOLEST SERPL-MCNC: 219 MG/DL (ref 0–200)
CO2 SERPL-SCNC: 26.7 MMOL/L (ref 22–29)
CREAT SERPL-MCNC: 0.71 MG/DL (ref 0.57–1)
EOSINOPHIL # BLD AUTO: 0.26 10*3/MM3 (ref 0–0.4)
EOSINOPHIL NFR BLD AUTO: 2.8 % (ref 0.3–6.2)
ERYTHROCYTE [DISTWIDTH] IN BLOOD BY AUTOMATED COUNT: 12.9 % (ref 12.3–15.4)
GLOBULIN SER CALC-MCNC: 2.4 GM/DL
GLUCOSE SERPL-MCNC: 127 MG/DL (ref 65–99)
HCT VFR BLD AUTO: 42.7 % (ref 34–46.6)
HDLC SERPL-MCNC: 37 MG/DL (ref 40–60)
HGB BLD-MCNC: 15 G/DL (ref 12–15.9)
IMM GRANULOCYTES # BLD AUTO: 0.06 10*3/MM3 (ref 0–0.05)
IMM GRANULOCYTES NFR BLD AUTO: 0.6 % (ref 0–0.5)
LDLC SERPL CALC-MCNC: 117 MG/DL (ref 0–100)
LYMPHOCYTES # BLD AUTO: 2.35 10*3/MM3 (ref 0.7–3.1)
LYMPHOCYTES NFR BLD AUTO: 25.1 % (ref 19.6–45.3)
MCH RBC QN AUTO: 31.8 PG (ref 26.6–33)
MCHC RBC AUTO-ENTMCNC: 35.1 G/DL (ref 31.5–35.7)
MCV RBC AUTO: 90.5 FL (ref 79–97)
MONOCYTES # BLD AUTO: 0.54 10*3/MM3 (ref 0.1–0.9)
MONOCYTES NFR BLD AUTO: 5.8 % (ref 5–12)
NEUTROPHILS # BLD AUTO: 6.07 10*3/MM3 (ref 1.7–7)
NEUTROPHILS NFR BLD AUTO: 64.7 % (ref 42.7–76)
NRBC BLD AUTO-RTO: 0 /100 WBC (ref 0–0.2)
PLATELET # BLD AUTO: 369 10*3/MM3 (ref 140–450)
POTASSIUM SERPL-SCNC: 4.3 MMOL/L (ref 3.5–5.2)
PROT SERPL-MCNC: 7 G/DL (ref 6–8.5)
RBC # BLD AUTO: 4.72 10*6/MM3 (ref 3.77–5.28)
SODIUM SERPL-SCNC: 141 MMOL/L (ref 136–145)
TRIGL SERPL-MCNC: 325 MG/DL (ref 0–150)
VLDLC SERPL CALC-MCNC: 65 MG/DL
WBC # BLD AUTO: 9.37 10*3/MM3 (ref 3.4–10.8)

## 2019-10-25 DIAGNOSIS — R73.9 HYPERGLYCEMIA: Primary | ICD-10-CM

## 2019-10-30 ENCOUNTER — RESULTS ENCOUNTER (OUTPATIENT)
Dept: FAMILY MEDICINE CLINIC | Facility: CLINIC | Age: 49
End: 2019-10-30

## 2019-10-30 DIAGNOSIS — R73.9 HYPERGLYCEMIA: ICD-10-CM

## 2019-11-13 ENCOUNTER — TELEPHONE (OUTPATIENT)
Dept: FAMILY MEDICINE CLINIC | Facility: CLINIC | Age: 49
End: 2019-11-13

## 2019-11-13 NOTE — TELEPHONE ENCOUNTER
Left message on vm that patient needs to come in for additional test, A1C, call office and make a lab appointment.

## 2019-11-27 ENCOUNTER — TELEPHONE (OUTPATIENT)
Dept: FAMILY MEDICINE CLINIC | Facility: CLINIC | Age: 49
End: 2019-11-27

## 2019-12-17 ENCOUNTER — TELEPHONE (OUTPATIENT)
Dept: FAMILY MEDICINE CLINIC | Facility: CLINIC | Age: 49
End: 2019-12-17

## 2020-04-07 ENCOUNTER — E-VISIT (OUTPATIENT)
Dept: FAMILY MEDICINE CLINIC | Facility: TELEHEALTH | Age: 50
End: 2020-04-07

## 2020-04-07 DIAGNOSIS — J32.9 SINUSITIS, UNSPECIFIED CHRONICITY, UNSPECIFIED LOCATION: ICD-10-CM

## 2020-04-07 DIAGNOSIS — J30.1 SEASONAL ALLERGIC RHINITIS DUE TO POLLEN: ICD-10-CM

## 2020-04-07 DIAGNOSIS — J06.9 ACUTE URI: Primary | ICD-10-CM

## 2020-04-07 PROCEDURE — 99422 OL DIG E/M SVC 11-20 MIN: CPT | Performed by: NURSE PRACTITIONER

## 2020-04-07 RX ORDER — METHYLPREDNISOLONE 4 MG/1
TABLET ORAL
Qty: 21 TABLET | Refills: 0 | Status: SHIPPED | OUTPATIENT
Start: 2020-04-07 | End: 2020-04-22

## 2020-04-07 RX ORDER — ALBUTEROL SULFATE 90 UG/1
2 AEROSOL, METERED RESPIRATORY (INHALATION) EVERY 4 HOURS PRN
Qty: 1 INHALER | Refills: 0 | Status: SHIPPED | OUTPATIENT
Start: 2020-04-07 | End: 2020-04-22 | Stop reason: SDUPTHER

## 2020-04-07 RX ORDER — AMOXICILLIN AND CLAVULANATE POTASSIUM 875; 125 MG/1; MG/1
1 TABLET, FILM COATED ORAL 2 TIMES DAILY
Qty: 14 TABLET | Refills: 0 | Status: SHIPPED | OUTPATIENT
Start: 2020-04-07 | End: 2020-04-14

## 2020-04-07 RX ORDER — BROMPHENIRAMINE MALEATE, PSEUDOEPHEDRINE HYDROCHLORIDE, AND DEXTROMETHORPHAN HYDROBROMIDE 2; 30; 10 MG/5ML; MG/5ML; MG/5ML
10 SYRUP ORAL 4 TIMES DAILY PRN
Qty: 280 ML | Refills: 0 | Status: SHIPPED | OUTPATIENT
Start: 2020-04-07 | End: 2021-01-15

## 2020-04-07 NOTE — PROGRESS NOTES
Joelle Williamson    1970  [id number]    I have reviewed the e-Visit questionnaire and patient's answers, my assessment and plan are as follows:    HPI- Pt reports cough, SOA, HA, earache, sore/scratchy throat, loss of taste/smell x 1 week. Pt has had this in the past and was given an inhaler, steroids, antibiotic and cough medicine. Pt is currently using Singulair and ibuprofen.    Review of Systems -Positive- cough, SOA, HA, earache, sore/scratchy throat, loss of taste/smell  Negative-Fever      Diagnoses and all orders for this visit:    Acute URI  -     brompheniramine-pseudoephedrine-DM 30-2-10 MG/5ML syrup; Take 10 mL by mouth 4 (Four) Times a Day As Needed for Congestion or Cough.    Sinusitis, unspecified chronicity, unspecified location  -     amoxicillin-clavulanate (Augmentin) 875-125 MG per tablet; Take 1 tablet by mouth 2 (Two) Times a Day for 7 days.  -     methylPREDNISolone (MEDROL, EVON,) 4 MG tablet; Take as directed on package instructions.      I have sent several medications to your pharmacy, please take as prescribed  Continue using Albuterol inhaler as needed  Alternate tylenol and motrin for pain and/or fever, stay hydrated and rest.   If symptoms worsen or do not improve follow up with your PCP or nearest urgent care.       Any medications prescribed have been sent electronically to   [address] AT [address] [facility] [address]  - [phone]  - [phone] [address] [place] [id number]  Phone: [phone] Fax: [phone]      I have spent a total of 15 minutes reviewing this chart.     IMAN Finn  04/07/20  1:16 PM

## 2020-04-07 NOTE — PATIENT INSTRUCTIONS
I have sent several medications to your pharmacy, please take as prescribed  Continue using Albuterol inhaler as needed  Alternate tylenol and motrin for pain and/or fever, stay hydrated and rest.   If symptoms worsen or do not improve follow up with your PCP or nearest urgent care.       Sinusitis, Adult  Sinusitis is inflammation of your sinuses. Sinuses are hollow spaces in the bones around your face. Your sinuses are located:  · Around your eyes.  · In the middle of your forehead.  · Behind your nose.  · In your cheekbones.  Mucus normally drains out of your sinuses. When your nasal tissues become inflamed or swollen, mucus can become trapped or blocked. This allows bacteria, viruses, and fungi to grow, which leads to infection. Most infections of the sinuses are caused by a virus.  Sinusitis can develop quickly. It can last for up to 4 weeks (acute) or for more than 12 weeks (chronic). Sinusitis often develops after a cold.  What are the causes?  This condition is caused by anything that creates swelling in the sinuses or stops mucus from draining. This includes:  · Allergies.  · Asthma.  · Infection from bacteria or viruses.  · Deformities or blockages in your nose or sinuses.  · Abnormal growths in the nose (nasal polyps).  · Pollutants, such as chemicals or irritants in the air.  · Infection from fungi (rare).  What increases the risk?  You are more likely to develop this condition if you:  · Have a weak body defense system (immune system).  · Do a lot of swimming or diving.  · Overuse nasal sprays.  · Smoke.  What are the signs or symptoms?  The main symptoms of this condition are pain and a feeling of pressure around the affected sinuses. Other symptoms include:  · Stuffy nose or congestion.  · Thick drainage from your nose.  · Swelling and warmth over the affected sinuses.  · Headache.  · Upper toothache.  · A cough that may get worse at night.  · Extra mucus that collects in the throat or the back of the  nose (postnasal drip).  · Decreased sense of smell and taste.  · Fatigue.  · A fever.  · Sore throat.  · Bad breath.  How is this diagnosed?  This condition is diagnosed based on:  · Your symptoms.  · Your medical history.  · A physical exam.  · Tests to find out if your condition is acute or chronic. This may include:  ? Checking your nose for nasal polyps.  ? Viewing your sinuses using a device that has a light (endoscope).  ? Testing for allergies or bacteria.  ? Imaging tests, such as an MRI or CT scan.  In rare cases, a bone biopsy may be done to rule out more serious types of fungal sinus disease.  How is this treated?  Treatment for sinusitis depends on the cause and whether your condition is chronic or acute.  · If caused by a virus, your symptoms should go away on their own within 10 days. You may be given medicines to relieve symptoms. They include:  ? Medicines that shrink swollen nasal passages (topical intranasal decongestants).  ? Medicines that treat allergies (antihistamines).  ? A spray that eases inflammation of the nostrils (topical intranasal corticosteroids).  ? Rinses that help get rid of thick mucus in your nose (nasal saline washes).  · If caused by bacteria, your health care provider may recommend waiting to see if your symptoms improve. Most bacterial infections will get better without antibiotic medicine. You may be given antibiotics if you have:  ? A severe infection.  ? A weak immune system.  · If caused by narrow nasal passages or nasal polyps, you may need to have surgery.  Follow these instructions at home:  Medicines  · Take, use, or apply over-the-counter and prescription medicines only as told by your health care provider. These may include nasal sprays.  · If you were prescribed an antibiotic medicine, take it as told by your health care provider. Do not stop taking the antibiotic even if you start to feel better.  Hydrate and humidify    · Drink enough fluid to keep your urine  pale yellow. Staying hydrated will help to thin your mucus.  · Use a cool mist humidifier to keep the humidity level in your home above 50%.  · Inhale steam for 10-15 minutes, 3-4 times a day, or as told by your health care provider. You can do this in the bathroom while a hot shower is running.  · Limit your exposure to cool or dry air.  Rest  · Rest as much as possible.  · Sleep with your head raised (elevated).  · Make sure you get enough sleep each night.  General instructions    · Apply a warm, moist washcloth to your face 3-4 times a day or as told by your health care provider. This will help with discomfort.  · Wash your hands often with soap and water to reduce your exposure to germs. If soap and water are not available, use hand .  · Do not smoke. Avoid being around people who are smoking (secondhand smoke).  · Keep all follow-up visits as told by your health care provider. This is important.  Contact a health care provider if:  · You have a fever.  · Your symptoms get worse.  · Your symptoms do not improve within 10 days.  Get help right away if:  · You have a severe headache.  · You have persistent vomiting.  · You have severe pain or swelling around your face or eyes.  · You have vision problems.  · You develop confusion.  · Your neck is stiff.  · You have trouble breathing.  Summary  · Sinusitis is soreness and inflammation of your sinuses. Sinuses are hollow spaces in the bones around your face.  · This condition is caused by nasal tissues that become inflamed or swollen. The swelling traps or blocks the flow of mucus. This allows bacteria, viruses, and fungi to grow, which leads to infection.  · If you were prescribed an antibiotic medicine, take it as told by your health care provider. Do not stop taking the antibiotic even if you start to feel better.  · Keep all follow-up visits as told by your health care provider. This is important.  This information is not intended to replace advice  "given to you by your health care provider. Make sure you discuss any questions you have with your health care provider.  Document Released: 12/18/2006 Document Revised: 05/20/2019 Document Reviewed: 05/20/2019  [employer] Interactive Patient Education © 2020 [employer]  Upper Respiratory Infection, Adult  An upper respiratory infection (URI) affects the nose, throat, and upper air passages. URIs are caused by germs (viruses). The most common type of URI is often called \"the common cold.\"  Medicines cannot cure URIs, but you can do things at home to relieve your symptoms. URIs usually get better within 7-10 days.  Follow these instructions at home:  Activity  · Rest as needed.  · If you have a fever, stay home from work or school until your fever is gone, or until your doctor says you may return to work or school.  ? You should stay home until you cannot spread the infection anymore (you are not contagious).  ? Your doctor may have you wear a face mask so you have less risk of spreading the infection.  Relieving symptoms  · Gargle with a salt-water mixture 3-4 times a day or as needed. To make a salt-water mixture, completely dissolve ½-1 tsp of salt in 1 cup of warm water.  · Use a cool-mist humidifier to add moisture to the air. This can help you breathe more easily.  Eating and drinking    · Drink enough fluid to keep your pee (urine) pale yellow.  · Eat soups and other clear broths.  General instructions    · Take over-the-counter and prescription medicines only as told by your doctor. These include cold medicines, fever reducers, and cough suppressants.  · Do not use any products that contain nicotine or tobacco. These include cigarettes and e-cigarettes. If you need help quitting, ask your doctor.  · Avoid being where people are smoking (avoid secondhand smoke).  · Make sure you get regular shots and get the flu shot every year.  · Keep all follow-up visits as told by your doctor. This is important.  How to " "avoid spreading infection to others    · Wash your hands often with soap and water. If you do not have soap and water, use hand .  · Avoid touching your mouth, face, eyes, or nose.  · Cough or sneeze into a tissue or your sleeve or elbow. Do not cough or sneeze into your hand or into the air.  Contact a doctor if:  · You are getting worse, not better.  · You have any of these:  ? A fever.  ? Chills.  ? Brown or red mucus in your nose.  ? Yellow or brown fluid (discharge)coming from your nose.  ? Pain in your face, especially when you bend forward.  ? Swollen neck glands.  ? Pain with swallowing.  ? White areas in the back of your throat.  Get help right away if:  · You have shortness of breath that gets worse.  · You have very bad or constant:  ? Headache.  ? Ear pain.  ? Pain in your forehead, behind your eyes, and over your cheekbones (sinus pain).  ? Chest pain.  · You have long-lasting (chronic) lung disease along with any of these:  ? Wheezing.  ? Long-lasting cough.  ? Coughing up blood.  ? A change in your usual mucus.  · You have a stiff neck.  · You have changes in your:  ? Vision.  ? Hearing.  ? Thinking.  ? Mood.  Summary  · An upper respiratory infection (URI) is caused by a germ called a virus. The most common type of URI is often called \"the common cold.\"  · URIs usually get better within 7-10 days.  · Take over-the-counter and prescription medicines only as told by your doctor.  This information is not intended to replace advice given to you by your health care provider. Make sure you discuss any questions you have with your health care provider.  Document Released: 06/05/2009 Document Revised: 12/26/2019 Document Reviewed: 08/10/2018  [employer] Interactive Patient Education © 2020 [employer]    "

## 2020-04-22 ENCOUNTER — TELEMEDICINE (OUTPATIENT)
Dept: FAMILY MEDICINE CLINIC | Facility: CLINIC | Age: 50
End: 2020-04-22

## 2020-04-22 DIAGNOSIS — F41.8 DEPRESSION WITH ANXIETY: ICD-10-CM

## 2020-04-22 DIAGNOSIS — J06.9 ACUTE URI: ICD-10-CM

## 2020-04-22 DIAGNOSIS — J30.1 SEASONAL ALLERGIC RHINITIS DUE TO POLLEN: ICD-10-CM

## 2020-04-22 DIAGNOSIS — F19.11 DRUG ABUSE IN REMISSION (HCC): ICD-10-CM

## 2020-04-22 DIAGNOSIS — F51.01 PRIMARY INSOMNIA: Primary | ICD-10-CM

## 2020-04-22 DIAGNOSIS — R73.01 FASTING HYPERGLYCEMIA: ICD-10-CM

## 2020-04-22 DIAGNOSIS — E78.2 MIXED HYPERLIPIDEMIA: ICD-10-CM

## 2020-04-22 PROCEDURE — 99214 OFFICE O/P EST MOD 30 MIN: CPT | Performed by: FAMILY MEDICINE

## 2020-04-22 RX ORDER — ALBUTEROL SULFATE 90 UG/1
2 AEROSOL, METERED RESPIRATORY (INHALATION) EVERY 4 HOURS PRN
Qty: 1 INHALER | Refills: 0 | Status: SHIPPED | OUTPATIENT
Start: 2020-04-22 | End: 2021-03-02 | Stop reason: SDUPTHER

## 2020-04-22 RX ORDER — LEVOFLOXACIN 500 MG/1
500 TABLET, FILM COATED ORAL DAILY
Qty: 10 TABLET | Refills: 0 | Status: SHIPPED | OUTPATIENT
Start: 2020-04-22 | End: 2020-05-14

## 2020-04-22 RX ORDER — MONTELUKAST SODIUM 10 MG/1
10 TABLET ORAL NIGHTLY
Qty: 90 TABLET | Refills: 1 | Status: SHIPPED | OUTPATIENT
Start: 2020-04-22 | End: 2021-01-15

## 2020-04-22 NOTE — PROGRESS NOTES
Michelle Williamson is a 50 y.o. female.     Chief Complaint   Patient presents with   • Hyperlipidemia       History of Present Illness   Anxiety/depression- moods are good. No SE of med except weight gain. Still seeing therapist.      hld- taking meds and due labs, no muscle aches     Insomnia- Does well if she has her remeron     Drug abuse- has done cocaine in the past. Still clean. For over a year.      Allergies stable on meds    Elevated blood sugar on last labs- never came in for a1c    Cough and congestion for 2 weeks and was given abx and improving some but relapsed after abx finished. Lost sense of smell.     The following portions of the patient's history were reviewed and updated as appropriate: allergies, current medications, past family history, past medical history, past social history, past surgical history and problem list.    Past Medical History:   Diagnosis Date   • Acute bronchitis    • Allergic rhinitis    • Anxiety    • Current every day smoker     smokes 1 to 2 packs per day for 15 years-currently uses other tobacco products   • Depression with anxiety    • Drug abuse in remission (CMS/[facility])    • High cholesterol    • History of depression    • Hyperlipidemia    • Insect bite    • Insect bite of leg    • Insomnia    • Nasal septal deviation    • Tobacco use        Past Surgical History:   Procedure Laterality Date   • COLONOSCOPY  2013   • HEMORRHOIDECTOMY     • HYSTERECTOMY     • LAPAROTOMY OOPHERECTOMY Right        Family History   Problem Relation Age of Onset   • Other Mother         Amyloidosis   • Anemia Mother    • Arthritis Mother    • Hyperlipidemia Mother    • Osteoporosis Mother    • Esophageal cancer Father    • Hyperlipidemia Father    • Breast cancer Maternal Grandmother    • Emphysema Maternal Grandfather    • No Known Problems Other        Social History     Socioeconomic History   • Marital status:      Spouse name: Not on file   • Number of children: Not on  file   • Years of education: Not on file   • Highest education level: Not on file   Tobacco Use   • Smoking status: Current Every Day Smoker     Packs/day: 1.00     Types: Cigarettes   • Smokeless tobacco: Never Used   • Tobacco comment: smokes 1 to 2 packs per day for 15 years, currently uses other tobacco products   Substance and Sexual Activity   • Alcohol use: Yes     Comment: social   • Drug use: Yes     Types: Cocaine     Comment: clean from cocaine x6 months       Review of Systems   Constitutional: Negative for fever.   Respiratory: Negative for shortness of breath.        Objective   There were no vitals taken for this visit.  There is no height or weight on file to calculate BMI.  Physical Exam      Assessment/Plan   Joelle was seen today for hyperlipidemia.    Diagnoses and all orders for this visit:    Primary insomnia    Mixed hyperlipidemia  -     Lipid Panel; Future  -     Comprehensive Metabolic Panel; Future    Depression with anxiety    Drug abuse in remission (CMS/HCC)    Seasonal allergic rhinitis due to pollen  -     albuterol sulfate  (90 Base) MCG/ACT inhaler; Inhale 2 puffs Every 4 (Four) Hours As Needed for Wheezing.  -     montelukast (Singulair) 10 MG tablet; Take 1 tablet by mouth Every Night.    Fasting hyperglycemia  -     Hemoglobin A1c; Future    Acute URI  -     QUESTIONNAIRE SERIES    Other orders  -     levoFLOXacin (Levaquin) 500 MG tablet; Take 1 tablet by mouth Daily.        Tried video and it would not work and had to convert to phone. Cont meds, f/u in 6 months. Gave strict ER warnings and self quarantine advice. Will not get labs until out of quarantine.

## 2020-04-27 ENCOUNTER — TELEPHONE (OUTPATIENT)
Dept: FAMILY MEDICINE CLINIC | Facility: CLINIC | Age: 50
End: 2020-04-27

## 2020-04-27 ENCOUNTER — RESULTS ENCOUNTER (OUTPATIENT)
Dept: FAMILY MEDICINE CLINIC | Facility: CLINIC | Age: 50
End: 2020-04-27

## 2020-04-27 DIAGNOSIS — E78.2 MIXED HYPERLIPIDEMIA: ICD-10-CM

## 2020-04-27 DIAGNOSIS — R73.01 FASTING HYPERGLYCEMIA: ICD-10-CM

## 2020-04-27 NOTE — TELEPHONE ENCOUNTER
Spoke with patient and she sounds really bad.  I informed her that she should go to the ER or the the [facility].  She stated that she planned to go to have COVID testing done next week.  I again informed her that she should go to have testing today because she does not want to end up having to stay in he hospital if she develops pneumonia.  Patient stated that her chest and her back is really hurting when she coughs.  Patient is wanting to know if we can send her in some cough medication.  I informed the patient that we really recommend she go to be see.  She still wanted to send a message to ask for cough medication

## 2020-04-27 NOTE — TELEPHONE ENCOUNTER
I had the opportunity to review the med list and/or validate the medication prescribed   Medication: Brompheniramine-Pseudoephedrine  Dose: MG/5ML  How does the patient take the medication? Syrup(oral)  How often does the patient take the medication? 2 teaspoons 4 times a day as needed  Pharmacy Type (local, mail order, specialty): Local  Pharmacy Name: [facility]  Pharmacy Phone number or location (if available): [facility]  Supply Amount (e.g, 30 or 90 days): 30 days      Patient would like callback at [phone] to discuss how long typically takes this virus to run it's course.

## 2020-04-27 NOTE — TELEPHONE ENCOUNTER
No, I don't want to give her something to cover up symptoms. I agree with you and she needs to go to [facility] or ER.

## 2020-05-14 DIAGNOSIS — J30.1 SEASONAL ALLERGIC RHINITIS DUE TO POLLEN: ICD-10-CM

## 2020-05-14 RX ORDER — CETIRIZINE HYDROCHLORIDE 10 MG/1
TABLET ORAL
Qty: 90 TABLET | Refills: 0 | Status: SHIPPED | OUTPATIENT
Start: 2020-05-14 | End: 2021-01-15

## 2020-05-14 RX ORDER — LEVOFLOXACIN 500 MG/1
TABLET, FILM COATED ORAL
Qty: 10 TABLET | Refills: 0 | Status: SHIPPED | OUTPATIENT
Start: 2020-05-14 | End: 2021-01-15

## 2021-01-15 ENCOUNTER — OFFICE VISIT (OUTPATIENT)
Dept: FAMILY MEDICINE CLINIC | Facility: CLINIC | Age: 51
End: 2021-01-15

## 2021-01-15 VITALS
DIASTOLIC BLOOD PRESSURE: 90 MMHG | TEMPERATURE: 97.7 F | SYSTOLIC BLOOD PRESSURE: 134 MMHG | WEIGHT: 161 LBS | OXYGEN SATURATION: 97 % | HEIGHT: 64 IN | BODY MASS INDEX: 27.49 KG/M2 | HEART RATE: 106 BPM

## 2021-01-15 DIAGNOSIS — J30.1 SEASONAL ALLERGIC RHINITIS DUE TO POLLEN: Primary | ICD-10-CM

## 2021-01-15 DIAGNOSIS — F41.8 DEPRESSION WITH ANXIETY: ICD-10-CM

## 2021-01-15 DIAGNOSIS — E78.2 MIXED HYPERLIPIDEMIA: ICD-10-CM

## 2021-01-15 DIAGNOSIS — F51.01 PRIMARY INSOMNIA: ICD-10-CM

## 2021-01-15 DIAGNOSIS — R73.01 FASTING HYPERGLYCEMIA: ICD-10-CM

## 2021-01-15 DIAGNOSIS — F19.11 DRUG ABUSE IN REMISSION (HCC): ICD-10-CM

## 2021-01-15 DIAGNOSIS — B00.9 HSV-1 INFECTION: ICD-10-CM

## 2021-01-15 PROCEDURE — 99214 OFFICE O/P EST MOD 30 MIN: CPT | Performed by: FAMILY MEDICINE

## 2021-01-15 RX ORDER — MIRTAZAPINE 30 MG/1
30 TABLET, FILM COATED ORAL DAILY
Qty: 90 TABLET | Refills: 3 | Status: SHIPPED | OUTPATIENT
Start: 2021-01-15 | End: 2021-03-02 | Stop reason: SDUPTHER

## 2021-01-15 RX ORDER — VALACYCLOVIR HYDROCHLORIDE 1 G/1
2000 TABLET, FILM COATED ORAL 2 TIMES DAILY
Qty: 4 TABLET | Refills: 5 | Status: SHIPPED | OUTPATIENT
Start: 2021-01-15 | End: 2021-03-02 | Stop reason: SDUPTHER

## 2021-01-15 NOTE — PROGRESS NOTES
Michelle Williamson is a 50 y.o. female.     Chief Complaint   Patient presents with   • Pain     below LT armpit - had a spider bite 6 mos ago - swelled back up       History of Present Illness   Anxiety/depression- moods are good. No SE of med except weight gain. Still seeing therapist. Stopped her meds and doing well     hld- not taking meds and due labs, no muscle aches     Insomnia- Does well on med and needs refill.      Drug abuse- has done cocaine in the past. Still clean. For over a year.      Allergies stable on meds    Elevated blood sugar on last labs- never came in for a1c    Has a spot on her left ribcage that she thought was a spider bite. Swelled up 6 months ago and then started to go away. Has swelled back up, always in the same spot. Some tingling. Started as blisters.     The following portions of the patient's history were reviewed and updated as appropriate: allergies, current medications, past family history, past medical history, past social history, past surgical history and problem list.    Past Medical History:   Diagnosis Date   • Acute bronchitis    • Allergic rhinitis    • Anxiety    • Current every day smoker     smokes 1 to 2 packs per day for 15 years-currently uses other tobacco products   • Depression with anxiety    • Drug abuse in remission (CMS/[facility])    • High cholesterol    • History of depression    • Hyperlipidemia    • Insect bite    • Insect bite of leg    • Insomnia    • Nasal septal deviation    • Tobacco use        Past Surgical History:   Procedure Laterality Date   • COLONOSCOPY  2013   • HEMORRHOIDECTOMY     • HYSTERECTOMY     • LAPAROTOMY OOPHERECTOMY Right        Family History   Problem Relation Age of Onset   • Other Mother         Amyloidosis   • Anemia Mother    • Arthritis Mother    • Hyperlipidemia Mother    • Osteoporosis Mother    • Esophageal cancer Father    • Hyperlipidemia Father    • Breast cancer Maternal Grandmother    • Emphysema Maternal  "Grandfather    • No Known Problems Other        Social History     Socioeconomic History   • Marital status:      Spouse name: Not on file   • Number of children: Not on file   • Years of education: Not on file   • Highest education level: Not on file   Tobacco Use   • Smoking status: Current Every Day Smoker     Packs/day: 1.00     Types: Cigarettes   • Smokeless tobacco: Never Used   • Tobacco comment: smokes 1 to 2 packs per day for 15 years, currently uses other tobacco products   Substance and Sexual Activity   • Alcohol use: Yes     Comment: social   • Drug use: Yes     Types: Cocaine(coke)     Comment: clean from cocaine x6 months       Review of Systems   Constitutional: Negative for fever.   Respiratory: Negative for shortness of breath.        Objective   Visit Vitals  /90   Pulse 106   Temp 97.7 °F (36.5 °C) (Infrared)   Ht 162.6 cm (64\")   Wt 73 kg (161 lb)   SpO2 97%   BMI 27.64 kg/m²     Body mass index is 27.64 kg/m².  Physical Exam   Constitutional: She is oriented to person, place, and time. She appears well-developed and well-nourished.   Cardiovascular: Normal rate, regular rhythm and normal heart sounds.   Pulmonary/Chest: Effort normal and breath sounds normal.   Musculoskeletal: Normal range of motion.   Neurological: She is alert and oriented to person, place, and time.   Skin: Skin is warm and dry.   Blisters in clusters on left rib cage.    Psychiatric: Her behavior is normal.         Assessment/Plan   Diagnoses and all orders for this visit:    1. Seasonal allergic rhinitis due to pollen (Primary)    2. Mixed hyperlipidemia  -     Comprehensive Metabolic Panel  -     Lipid Panel    3. Depression with anxiety    4. Primary insomnia  -     mirtazapine (REMERON) 30 MG tablet; Take 1 tablet by mouth Daily.  Dispense: 90 tablet; Refill: 3    5. Drug abuse in remission (CMS/[facility])    6. Fasting hyperglycemia  -     Hemoglobin A1c    7. HSV-1 infection  -     valACYclovir (Valtrex) " 1000 MG tablet; Take 2 tablets by mouth 2 (Two) Times a Day. Start with first sign of an outbreak.  Dispense: 4 tablet; Refill: 5        If cholesterol high, will call statin back in. Cont meds, f/u if worse or no better and will refer to derm. F/U in 6 months.

## 2021-01-16 LAB
ALBUMIN SERPL-MCNC: 4.7 G/DL (ref 3.5–5.2)
ALBUMIN/GLOB SERPL: 1.9 G/DL
ALP SERPL-CCNC: 143 U/L (ref 39–117)
ALT SERPL-CCNC: 65 U/L (ref 1–33)
AST SERPL-CCNC: 32 U/L (ref 1–32)
BILIRUB SERPL-MCNC: 0.5 MG/DL (ref 0–1.2)
BUN SERPL-MCNC: 13 MG/DL (ref 6–20)
BUN/CREAT SERPL: 17.8 (ref 7–25)
CALCIUM SERPL-MCNC: 9.9 MG/DL (ref 8.6–10.5)
CHLORIDE SERPL-SCNC: 98 MMOL/L (ref 98–107)
CHOLEST SERPL-MCNC: 306 MG/DL (ref 0–200)
CO2 SERPL-SCNC: 28.5 MMOL/L (ref 22–29)
CREAT SERPL-MCNC: 0.73 MG/DL (ref 0.57–1)
GLOBULIN SER CALC-MCNC: 2.5 GM/DL
GLUCOSE SERPL-MCNC: 108 MG/DL (ref 65–99)
HBA1C MFR BLD: 6.4 % (ref 4.8–5.6)
HDLC SERPL-MCNC: 69 MG/DL (ref 40–60)
LDLC SERPL CALC-MCNC: 207 MG/DL (ref 0–100)
POTASSIUM SERPL-SCNC: 4.3 MMOL/L (ref 3.5–5.2)
PROT SERPL-MCNC: 7.2 G/DL (ref 6–8.5)
SODIUM SERPL-SCNC: 137 MMOL/L (ref 136–145)
TRIGL SERPL-MCNC: 162 MG/DL (ref 0–150)
VLDLC SERPL CALC-MCNC: 30 MG/DL (ref 5–40)

## 2021-01-18 ENCOUNTER — TELEPHONE (OUTPATIENT)
Dept: FAMILY MEDICINE CLINIC | Facility: CLINIC | Age: 51
End: 2021-01-18

## 2021-01-18 DIAGNOSIS — E78.2 MIXED HYPERLIPIDEMIA: ICD-10-CM

## 2021-01-18 RX ORDER — ATORVASTATIN CALCIUM 20 MG/1
20 TABLET, FILM COATED ORAL DAILY
Qty: 90 TABLET | Refills: 3 | Status: SHIPPED | OUTPATIENT
Start: 2021-01-18 | End: 2021-03-02 | Stop reason: SDUPTHER

## 2021-01-18 NOTE — TELEPHONE ENCOUNTER
lmtcb okay for hub to read    Your cholesterol is very high.  Please go back on medication.  I called it into your [place] pharmacy.  Your blood sugar is very high and almost to the level of diabetes.  Please work hard on diet and exercise or or I fear that you will slip over into diabetes.  Please follow-up in 3 months.  We will need to follow your liver levels and your cholesterol.

## 2021-01-18 NOTE — TELEPHONE ENCOUNTER
----- Message from Melody Mary MD sent at 1/18/2021  8:15 AM EST -----  Your cholesterol is very high.  Please go back on medication.  I called it into your [place] pharmacy.  Your blood sugar is very high and almost to the level of diabetes.  Please work hard on diet and exercise or or I fear that you will slip over into diabetes.  Please follow-up in 3 months.  We will need to follow your liver levels and your cholesterol.

## 2021-03-02 ENCOUNTER — OFFICE VISIT (OUTPATIENT)
Dept: FAMILY MEDICINE CLINIC | Facility: CLINIC | Age: 51
End: 2021-03-02

## 2021-03-02 ENCOUNTER — PRIOR AUTHORIZATION (OUTPATIENT)
Dept: FAMILY MEDICINE CLINIC | Facility: CLINIC | Age: 51
End: 2021-03-02

## 2021-03-02 VITALS
HEART RATE: 90 BPM | SYSTOLIC BLOOD PRESSURE: 129 MMHG | WEIGHT: 162 LBS | HEIGHT: 64 IN | TEMPERATURE: 95.7 F | OXYGEN SATURATION: 94 % | BODY MASS INDEX: 27.66 KG/M2 | DIASTOLIC BLOOD PRESSURE: 88 MMHG

## 2021-03-02 DIAGNOSIS — B00.9 HSV-1 INFECTION: Primary | ICD-10-CM

## 2021-03-02 DIAGNOSIS — F51.01 PRIMARY INSOMNIA: ICD-10-CM

## 2021-03-02 DIAGNOSIS — B86 SCABIES: ICD-10-CM

## 2021-03-02 DIAGNOSIS — J30.1 SEASONAL ALLERGIC RHINITIS DUE TO POLLEN: ICD-10-CM

## 2021-03-02 DIAGNOSIS — F41.8 DEPRESSION WITH ANXIETY: ICD-10-CM

## 2021-03-02 DIAGNOSIS — E78.2 MIXED HYPERLIPIDEMIA: ICD-10-CM

## 2021-03-02 PROCEDURE — 99214 OFFICE O/P EST MOD 30 MIN: CPT | Performed by: FAMILY MEDICINE

## 2021-03-02 RX ORDER — PERMETHRIN 50 MG/G
CREAM TOPICAL ONCE
Qty: 60 G | Refills: 1 | Status: SHIPPED | OUTPATIENT
Start: 2021-03-02 | End: 2021-03-02

## 2021-03-02 RX ORDER — ATORVASTATIN CALCIUM 20 MG/1
20 TABLET, FILM COATED ORAL DAILY
Qty: 90 TABLET | Refills: 3 | Status: SHIPPED | OUTPATIENT
Start: 2021-03-02

## 2021-03-02 RX ORDER — ALBUTEROL SULFATE 90 UG/1
2 AEROSOL, METERED RESPIRATORY (INHALATION) EVERY 4 HOURS PRN
Qty: 18 G | Refills: 1 | Status: SHIPPED | OUTPATIENT
Start: 2021-03-02 | End: 2021-08-05 | Stop reason: SDUPTHER

## 2021-03-02 RX ORDER — MIRTAZAPINE 30 MG/1
30 TABLET, FILM COATED ORAL DAILY
Qty: 90 TABLET | Refills: 3 | Status: SHIPPED | OUTPATIENT
Start: 2021-03-02

## 2021-03-02 RX ORDER — VALACYCLOVIR HYDROCHLORIDE 1 G/1
2000 TABLET, FILM COATED ORAL 2 TIMES DAILY
Qty: 4 TABLET | Refills: 5 | Status: SHIPPED | OUTPATIENT
Start: 2021-03-02

## 2021-03-02 NOTE — PROGRESS NOTES
Michelle Williamson is a 51 y.o. female.     Chief Complaint   Patient presents with   • Insect Bite       History of Present Illness   Has a spot on her left ribcage that she thought was a spider bite. Swelled up 7 months ago and then started to go away. Has swelled back up, always in the same spot. Some tingling. Started as blisters.     Now for a few weeks having very itchy spots on face and around hair line after being exposed to someone with the same issue. Itching worse at night.         The following portions of the patient's history were reviewed and updated as appropriate: allergies, current medications, past family history, past medical history, past social history, past surgical history and problem list.    Past Medical History:   Diagnosis Date   • Acute bronchitis    • Allergic rhinitis    • Anxiety    • Current every day smoker     smokes 1 to 2 packs per day for 15 years-currently uses other tobacco products   • Depression with anxiety    • Drug abuse in remission (CMS/[facility])    • High cholesterol    • History of depression    • Hyperlipidemia    • Insect bite    • Insect bite of leg    • Insomnia    • Nasal septal deviation    • Tobacco use        Past Surgical History:   Procedure Laterality Date   • COLONOSCOPY  2013   • HEMORRHOIDECTOMY     • HYSTERECTOMY     • LAPAROTOMY OOPHERECTOMY Right        Family History   Problem Relation Age of Onset   • Other Mother         Amyloidosis   • Anemia Mother    • Arthritis Mother    • Hyperlipidemia Mother    • Osteoporosis Mother    • Esophageal cancer Father    • Hyperlipidemia Father    • Breast cancer Maternal Grandmother    • Emphysema Maternal Grandfather    • No Known Problems Other        Social History     Socioeconomic History   • Marital status:      Spouse name: Not on file   • Number of children: Not on file   • Years of education: Not on file   • Highest education level: Not on file   Tobacco Use   • Smoking status: Current Every  "Day Smoker     Packs/day: 1.00     Types: Cigarettes   • Smokeless tobacco: Never Used   • Tobacco comment: smokes 1 to 2 packs per day for 15 years, currently uses other tobacco products   Substance and Sexual Activity   • Alcohol use: Yes     Comment: social   • Drug use: Yes     Types: Cocaine(coke)     Comment: clean from cocaine x6 months       Review of Systems   Constitutional: Negative for fever.       Objective   Visit Vitals  /88 (BP Location: Left arm, Patient Position: Sitting)   Pulse 90   Temp 95.7 °F (35.4 °C)   Ht 162.6 cm (64.02\")   Wt 73.5 kg (162 lb)   SpO2 94%   BMI 27.79 kg/m²     Body mass index is 27.79 kg/m².  Physical Exam  Constitutional:       General: She is not in acute distress.     Appearance: Normal appearance.   Skin:     Comments: Small irritated line under left arm that started as blisters but is excoriated now.  In lines around hair line having red insect bites similar to scabies.    Neurological:      General: No focal deficit present.      Mental Status: She is alert and oriented to person, place, and time.   Psychiatric:         Mood and Affect: Mood normal.         Behavior: Behavior normal.           Assessment/Plan   Diagnoses and all orders for this visit:    1. HSV-1 infection (Primary)  -     Ambulatory Referral to Dermatology  -     valACYclovir (Valtrex) 1000 MG tablet; Take 2 tablets by mouth 2 (Two) Times a Day. Start with first sign of an outbreak.  Dispense: 4 tablet; Refill: 5    2. Scabies  -     permethrin (ELIMITE) 5 % cream; Apply  topically to the appropriate area as directed 1 (One) Time for 1 dose.  Dispense: 60 g; Refill: 1    3. Depression with anxiety    4. Mixed hyperlipidemia  -     atorvastatin (LIPITOR) 20 MG tablet; Take 1 tablet by mouth Daily.  Dispense: 90 tablet; Refill: 3    5. Seasonal allergic rhinitis due to pollen  -     albuterol sulfate  (90 Base) MCG/ACT inhaler; Inhale 2 puffs Every 4 (Four) Hours As Needed for Wheezing.  " Dispense: 18 g; Refill: 1    6. Primary insomnia  -     mirtazapine (REMERON) 30 MG tablet; Take 1 tablet by mouth Daily.  Dispense: 90 tablet; Refill: 3        Repeat scabies treatment in 2 weeks.

## 2021-03-24 ENCOUNTER — BULK ORDERING (OUTPATIENT)
Dept: CASE MANAGEMENT | Facility: OTHER | Age: 51
End: 2021-03-24

## 2021-03-24 DIAGNOSIS — Z23 IMMUNIZATION DUE: ICD-10-CM

## 2021-07-26 ENCOUNTER — TELEPHONE (OUTPATIENT)
Dept: FAMILY MEDICINE CLINIC | Facility: CLINIC | Age: 51
End: 2021-07-26

## 2021-07-26 ENCOUNTER — TELEMEDICINE (OUTPATIENT)
Dept: FAMILY MEDICINE CLINIC | Facility: CLINIC | Age: 51
End: 2021-07-26

## 2021-07-26 DIAGNOSIS — B86 SCABIES: Primary | ICD-10-CM

## 2021-07-26 PROCEDURE — 99213 OFFICE O/P EST LOW 20 MIN: CPT | Performed by: FAMILY MEDICINE

## 2021-07-26 RX ORDER — PERMETHRIN 50 MG/G
CREAM TOPICAL ONCE
Qty: 1 EACH | Refills: 1 | Status: SHIPPED | OUTPATIENT
Start: 2021-07-26 | End: 2021-07-26

## 2021-07-26 NOTE — TELEPHONE ENCOUNTER
Caller: Joelle Williamson    Relationship: Self    Best call back number: [phone] (M)    What medication are you requesting: PERMETHRINE CREAM    What are your current symptoms: RASH    How long have you been experiencing symptoms: 5 DAYS    Have you had these symptoms before:    [x] Yes  [] No    Have you been treated for these symptoms before:   [x] Yes  [] No    If a prescription is needed, what is your preferred pharmacy and phone number: [id number] - [facility] [address] - [phone]  - [phone] FX     Additional notes:

## 2021-07-26 NOTE — PROGRESS NOTES
Michelle Williamson is a 51 y.o. female.     Chief Complaint   Patient presents with   • Rash       History of Present Illness   Patient was diagnosed with scabies 4 months ago and was given permethrin cream. Was having itchy bites especially around the hairline. This started after an exposure to someone with the same issue. It resolved and it is now back for one week after doing laundry for this friend.     The following portions of the patient's history were reviewed and updated as appropriate: allergies, current medications, past family history, past medical history, past social history, past surgical history and problem list.    Past Medical History:   Diagnosis Date   • Acute bronchitis    • Allergic rhinitis    • Anxiety    • Current every day smoker     smokes 1 to 2 packs per day for 15 years-currently uses other tobacco products   • Depression with anxiety    • Drug abuse in remission (CMS/[facility])    • High cholesterol    • History of depression    • Hyperlipidemia    • Insect bite    • Insect bite of leg    • Insomnia    • Nasal septal deviation    • Tobacco use        Past Surgical History:   Procedure Laterality Date   • COLONOSCOPY  2013   • HEMORRHOIDECTOMY     • HYSTERECTOMY     • LAPAROTOMY OOPHERECTOMY Right        Family History   Problem Relation Age of Onset   • Other Mother         Amyloidosis   • Anemia Mother    • Arthritis Mother    • Hyperlipidemia Mother    • Osteoporosis Mother    • Esophageal cancer Father    • Hyperlipidemia Father    • Breast cancer Maternal Grandmother    • Emphysema Maternal Grandfather    • No Known Problems Other        Social History     Socioeconomic History   • Marital status:      Spouse name: Not on file   • Number of children: Not on file   • Years of education: Not on file   • Highest education level: Not on file   Tobacco Use   • Smoking status: Current Every Day Smoker     Packs/day: 1.00     Types: Cigarettes   • Smokeless tobacco: Never Used    • Tobacco comment: smokes 1 to 2 packs per day for 15 years, currently uses other tobacco products   Substance and Sexual Activity   • Alcohol use: Yes     Comment: social   • Drug use: Yes     Types: Cocaine(coke)     Comment: clean from cocaine x6 months       Review of Systems   Constitutional: Negative for fever.       Objective   There were no vitals taken for this visit.  There is no height or weight on file to calculate BMI.  Physical Exam  Constitutional:       General: She is not in acute distress.     Appearance: Normal appearance.   Neurological:      General: No focal deficit present.      Mental Status: She is alert and oriented to person, place, and time.   Psychiatric:         Mood and Affect: Mood normal.         Behavior: Behavior normal.           Assessment/Plan   Diagnoses and all orders for this visit:    1. Scabies (Primary)  -     permethrin (ELIMITE) 5 % cream; Apply  topically to the appropriate area as directed 1 (One) Time for 1 dose. And repeat in 2 weeks if still having new lesions.  Dispense: 1 each; Refill: 1      Consent to video visit and spent 8 minutes.  Follow-up if worse or no better.  Discussed environmental changes that will have to be made such as washing the sheets etc.

## 2021-07-26 NOTE — TELEPHONE ENCOUNTER
Needs an appointment.  It can be a video visit.  You can work her on to the end of my day if you need to.  Even if there is not a spot.

## 2021-08-05 ENCOUNTER — TELEMEDICINE (OUTPATIENT)
Dept: FAMILY MEDICINE CLINIC | Facility: CLINIC | Age: 51
End: 2021-08-05

## 2021-08-05 DIAGNOSIS — J30.1 SEASONAL ALLERGIC RHINITIS DUE TO POLLEN: ICD-10-CM

## 2021-08-05 DIAGNOSIS — R05.9 COUGH: Primary | ICD-10-CM

## 2021-08-05 PROCEDURE — 99213 OFFICE O/P EST LOW 20 MIN: CPT | Performed by: FAMILY MEDICINE

## 2021-08-05 RX ORDER — ALBUTEROL SULFATE 90 UG/1
2 AEROSOL, METERED RESPIRATORY (INHALATION) EVERY 4 HOURS PRN
Qty: 18 G | Refills: 1 | Status: SHIPPED | OUTPATIENT
Start: 2021-08-05 | End: 2021-08-27 | Stop reason: SDUPTHER

## 2021-08-05 RX ORDER — AMOXICILLIN 500 MG/1
500 CAPSULE ORAL 3 TIMES DAILY
Qty: 15 CAPSULE | Refills: 0 | Status: SHIPPED | OUTPATIENT
Start: 2021-08-05 | End: 2021-08-10

## 2021-08-05 RX ORDER — METHYLPREDNISOLONE 4 MG/1
TABLET ORAL
Qty: 21 EACH | Refills: 0 | Status: SHIPPED | OUTPATIENT
Start: 2021-08-05 | End: 2021-08-27

## 2021-08-05 NOTE — PROGRESS NOTES
Michelle Williamson is a 51 y.o. female.   Consent given    Time 20 min    Visit via [facility]    CC: cough, congestion, allergies flare up, rib pain , lungs hurt, had fever yesterday      History of Present Illness     cough, congestion, allergies flare up, rib pain , lungs hurt, had fever yesterday  No Covid exposure, but was not vaccinated    The following portions of the patient's history were reviewed and updated as appropriate: allergies, current medications, past family history, past medical history, past social history, past surgical history and problem list.    Past Medical History:   Diagnosis Date   • Acute bronchitis    • Allergic rhinitis    • Anxiety    • Current every day smoker     smokes 1 to 2 packs per day for 15 years-currently uses other tobacco products   • Depression with anxiety    • Drug abuse in remission (CMS/[facility])    • High cholesterol    • History of depression    • Hyperlipidemia    • Insect bite    • Insect bite of leg    • Insomnia    • Nasal septal deviation    • Tobacco use        Past Surgical History:   Procedure Laterality Date   • COLONOSCOPY  2013   • HEMORRHOIDECTOMY     • HYSTERECTOMY     • LAPAROTOMY OOPHERECTOMY Right        Family History   Problem Relation Age of Onset   • Other Mother         Amyloidosis   • Anemia Mother    • Arthritis Mother    • Hyperlipidemia Mother    • Osteoporosis Mother    • Esophageal cancer Father    • Hyperlipidemia Father    • Breast cancer Maternal Grandmother    • Emphysema Maternal Grandfather    • No Known Problems Other        Social History     Socioeconomic History   • Marital status:      Spouse name: Not on file   • Number of children: Not on file   • Years of education: Not on file   • Highest education level: Not on file   Tobacco Use   • Smoking status: Current Every Day Smoker     Packs/day: 1.00     Types: Cigarettes   • Smokeless tobacco: Never Used   • Tobacco comment: smokes 1 to 2 packs per day for 15 years,  currently uses other tobacco products   Substance and Sexual Activity   • Alcohol use: Yes     Comment: social   • Drug use: Yes     Types: Cocaine(coke)     Comment: clean from cocaine x6 months       Current Outpatient Medications on File Prior to Visit   Medication Sig Dispense Refill   • atorvastatin (LIPITOR) 20 MG tablet Take 1 tablet by mouth Daily. 90 tablet 3   • mirtazapine (REMERON) 30 MG tablet Take 1 tablet by mouth Daily. 90 tablet 3   • valACYclovir (Valtrex) 1000 MG tablet Take 2 tablets by mouth 2 (Two) Times a Day. Start with first sign of an outbreak. 4 tablet 5   • [DISCONTINUED] albuterol sulfate  (90 Base) MCG/ACT inhaler Inhale 2 puffs Every 4 (Four) Hours As Needed for Wheezing. 18 g 1     No current facility-administered medications on file prior to visit.       Review of Systems   HENT: Positive for congestion.    Respiratory: Positive for cough.        No results found for this or any previous visit (from the past 4704 hour(s)).  Objective   There were no vitals filed for this visit.  There is no height or weight on file to calculate BMI.  Physical Exam  Constitutional:       Appearance: Normal appearance.   Neurological:      Mental Status: She is alert.           Diagnoses and all orders for this visit:    1. Cough (Primary)  -     amoxicillin (AMOXIL) 500 MG capsule; Take 1 capsule by mouth 3 (Three) Times a Day for 5 days.  Dispense: 15 capsule; Refill: 0  -     albuterol sulfate  (90 Base) MCG/ACT inhaler; Inhale 2 puffs Every 4 (Four) Hours As Needed for Wheezing.  Dispense: 18 g; Refill: 1  -     methylPREDNISolone (MEDROL) 4 MG dose pack; Take as directed on package instructions.  Dispense: 21 each; Refill: 0    2. Seasonal allergic rhinitis due to pollen  -     albuterol sulfate  (90 Base) MCG/ACT inhaler; Inhale 2 puffs Every 4 (Four) Hours As Needed for Wheezing.  Dispense: 18 g; Refill: 1      Return if symptoms worsen or fail to improve.

## 2021-08-27 ENCOUNTER — TELEPHONE (OUTPATIENT)
Dept: FAMILY MEDICINE CLINIC | Facility: CLINIC | Age: 51
End: 2021-08-27

## 2022-01-05 ENCOUNTER — TELEMEDICINE (OUTPATIENT)
Dept: FAMILY MEDICINE CLINIC | Facility: CLINIC | Age: 52
End: 2022-01-05

## 2022-01-05 DIAGNOSIS — J04.0 LARYNGITIS: ICD-10-CM

## 2022-01-05 DIAGNOSIS — R05.9 COUGH: Primary | ICD-10-CM

## 2022-01-05 PROCEDURE — 99214 OFFICE O/P EST MOD 30 MIN: CPT | Performed by: FAMILY MEDICINE

## 2022-01-05 RX ORDER — AZITHROMYCIN 250 MG/1
TABLET, FILM COATED ORAL
Qty: 6 TABLET | Refills: 0 | Status: SHIPPED | OUTPATIENT
Start: 2022-01-05 | End: 2022-02-02

## 2022-01-05 RX ORDER — PREDNISONE 20 MG/1
TABLET ORAL
Qty: 18 TABLET | Refills: 0 | Status: SHIPPED | OUTPATIENT
Start: 2022-01-05 | End: 2022-02-02

## 2022-01-05 NOTE — PROGRESS NOTES
C/o cough and hoarseness.      Michelle Williamson is a 51 y.o. female.     History of Present Illness   Telemedicine visit due to Covid.  Consent obtained.  11-minute visit.  Patient is a 51-year-old female with obesity.  Patient states she has had 2-week history of hoarseness.  She states this is not unusual as she gets this with seasonal change.  4 days ago she developed a cough with rhinorrhea.  She also has developed fever and body aches.  She states she had a family gathering January 1 and ever since then people have been sick.  She has had 2 nephews who were there who have turned positive for Covid.  She has not had any immunizations for Covid.    The following portions of the patient's history were reviewed and updated as appropriate: allergies, current medications, past family history, past medical history, past social history, past surgical history and problem list.    Review of Systems   Constitutional: Positive for fatigue and fever.   HENT: Positive for postnasal drip.    Respiratory: Positive for cough.        Patient Active Problem List   Diagnosis   • Primary insomnia   • Mixed hyperlipidemia   • Drug abuse in remission (HCC)   • Depression with anxiety   • Seasonal allergic rhinitis due to pollen   • Scabies   • Cough   • Laryngitis       Allergies   Allergen Reactions   • Morphine And Related          Current Outpatient Medications:   •  albuterol sulfate  (90 Base) MCG/ACT inhaler, Inhale 2 puffs Every 4 (Four) Hours As Needed for Wheezing., Disp: 18 g, Rfl: 0  •  atorvastatin (LIPITOR) 20 MG tablet, Take 1 tablet by mouth Daily., Disp: 90 tablet, Rfl: 3  •  benzonatate (TESSALON) 100 MG capsule, Take 1-2 capsules by mouth 3 (Three) Times a Day As Needed for Cough., Disp: 30 capsule, Rfl: 0  •  mirtazapine (REMERON) 30 MG tablet, Take 1 tablet by mouth Daily., Disp: 90 tablet, Rfl: 3  •  predniSONE (DELTASONE) 50 MG tablet, Take 1 tablet by mouth Daily With Breakfast., Disp: 5  tablet, Rfl: 0  •  promethazine-dextromethorphan (PROMETHAZINE-DM) 6.25-15 MG/5ML syrup, Take 5 mL by mouth At Night As Needed for Cough., Disp: 118 mL, Rfl: 0  •  valACYclovir (Valtrex) 1000 MG tablet, Take 2 tablets by mouth 2 (Two) Times a Day. Start with first sign of an outbreak., Disp: 4 tablet, Rfl: 5    Past Medical History:   Diagnosis Date   • Acute bronchitis    • Allergic rhinitis    • Anxiety    • Current every day smoker     smokes 1 to 2 packs per day for 15 years-currently uses other tobacco products   • Depression with anxiety    • Drug abuse in remission (CMS/[facility])    • High cholesterol    • History of depression    • Hyperlipidemia    • Insect bite    • Insect bite of leg    • Insomnia    • Nasal septal deviation    • Tobacco use        Past Surgical History:   Procedure Laterality Date   • COLONOSCOPY  2013   • HEMORRHOIDECTOMY     • HYSTERECTOMY     • LAPAROTOMY OOPHERECTOMY Right        Family History   Problem Relation Age of Onset   • Other Mother         Amyloidosis   • Anemia Mother    • Arthritis Mother    • Hyperlipidemia Mother    • Osteoporosis Mother    • Esophageal cancer Father    • Hyperlipidemia Father    • Breast cancer Maternal Grandmother    • Emphysema Maternal Grandfather    • No Known Problems Other        Social History     Tobacco Use   • Smoking status: Current Every Day Smoker     Packs/day: 1.00     Types: Cigarettes   • Smokeless tobacco: Never Used   • Tobacco comment: smokes 1 to 2 packs per day for 15 years, currently uses other tobacco products   Substance Use Topics   • Alcohol use: Yes     Comment: social            Objective     There were no vitals filed for this visit.  There is no height or weight on file to calculate BMI.    Physical Exam  Pulmonary:      Effort: Pulmonary effort is normal.      Comments: Noted to be hoarse.  Neurological:      Mental Status: She is alert and oriented to person, place, and time.   Psychiatric:         Mood and Affect: Mood  normal.         Thought Content: Thought content normal.         Lab Results   Component Value Date    GLUCOSE 108 (H) 01/15/2021    BUN 13 01/15/2021    CREATININE 0.73 01/15/2021    EGFRIFNONA 85 01/15/2021    EGFRIFAFRI 103 01/15/2021    BCR 17.8 01/15/2021    K 4.3 01/15/2021    CO2 28.5 01/15/2021    CALCIUM 9.9 01/15/2021    PROTENTOTREF 7.2 01/15/2021    ALBUMIN 4.70 01/15/2021    LABIL2 1.9 01/15/2021    AST 32 01/15/2021    ALT 65 (H) 01/15/2021       WBC   Date Value Ref Range Status   10/23/2019 9.37 3.40 - 10.80 10*3/mm3 Final     RBC   Date Value Ref Range Status   10/23/2019 4.72 3.77 - 5.28 10*6/mm3 Final     Hemoglobin   Date Value Ref Range Status   10/23/2019 15.0 12.0 - 15.9 g/dL Final   10/13/2018 13.2 11.9 - 15.5 g/dL Final     Hematocrit   Date Value Ref Range Status   10/23/2019 42.7 34.0 - 46.6 % Final   10/13/2018 38.3 35.6 - 45.5 % Final     MCV   Date Value Ref Range Status   10/23/2019 90.5 79.0 - 97.0 fL Final   10/13/2018 90.1 80.5 - 98.2 fL Final     MCH   Date Value Ref Range Status   10/23/2019 31.8 26.6 - 33.0 pg Final   10/13/2018 31.1 26.9 - 32.0 pg Final     MCHC   Date Value Ref Range Status   10/23/2019 35.1 31.5 - 35.7 g/dL Final   10/13/2018 34.5 32.4 - 36.3 g/dL Final     RDW   Date Value Ref Range Status   10/23/2019 12.9 12.3 - 15.4 % Final   10/13/2018 13.5 (H) 11.7 - 13.0 % Final     RDW-SD   Date Value Ref Range Status   10/13/2018 44.7 37.0 - 54.0 fl Final     MPV   Date Value Ref Range Status   10/13/2018 9.9 6.0 - 12.0 fL Final     Platelets   Date Value Ref Range Status   10/23/2019 369 140 - 450 10*3/mm3 Final   10/13/2018 271 140 - 500 10*3/mm3 Final     Neutrophil Rel %   Date Value Ref Range Status   10/23/2019 64.7 42.7 - 76.0 % Final     Neutrophil %   Date Value Ref Range Status   10/13/2018 79.2 (H) 42.7 - 76.0 % Final     Lymphocyte Rel %   Date Value Ref Range Status   10/23/2019 25.1 19.6 - 45.3 % Final     Lymphocyte %   Date Value Ref Range Status    10/13/2018 15.3 (L) 19.6 - 45.3 % Final     Monocyte Rel %   Date Value Ref Range Status   10/23/2019 5.8 5.0 - 12.0 % Final     Monocyte %   Date Value Ref Range Status   10/13/2018 5.2 5.0 - 12.0 % Final     Eosinophil Rel %   Date Value Ref Range Status   10/23/2019 2.8 0.3 - 6.2 % Final     Eosinophil %   Date Value Ref Range Status   10/13/2018 0.0 (L) 0.3 - 6.2 % Final     Basophil Rel %   Date Value Ref Range Status   10/23/2019 1.0 0.0 - 1.5 % Final     Basophil %   Date Value Ref Range Status   10/13/2018 0.3 0.0 - 1.5 % Final     Immature Grans %   Date Value Ref Range Status   10/13/2018 0.4 0.0 - 0.5 % Final     Neutrophils Absolute   Date Value Ref Range Status   10/23/2019 6.07 1.70 - 7.00 10*3/mm3 Final     Neutrophils, Absolute   Date Value Ref Range Status   10/13/2018 7.25 1.90 - 8.10 10*3/mm3 Final     Lymphocytes Absolute   Date Value Ref Range Status   10/23/2019 2.35 0.70 - 3.10 10*3/mm3 Final     Lymphocytes, Absolute   Date Value Ref Range Status   10/13/2018 1.40 0.90 - 4.80 10*3/mm3 Final     Monocytes Absolute   Date Value Ref Range Status   10/23/2019 0.54 0.10 - 0.90 10*3/mm3 Final     Monocytes, Absolute   Date Value Ref Range Status   10/13/2018 0.48 0.20 - 1.20 10*3/mm3 Final     Eosinophils Absolute   Date Value Ref Range Status   10/23/2019 0.26 0.00 - 0.40 10*3/mm3 Final     Eosinophils, Absolute   Date Value Ref Range Status   10/13/2018 0.00 0.00 - 0.70 10*3/mm3 Final     Basophils Absolute   Date Value Ref Range Status   10/23/2019 0.09 0.00 - 0.20 10*3/mm3 Final     Basophils, Absolute   Date Value Ref Range Status   10/13/2018 0.03 0.00 - 0.20 10*3/mm3 Final     Immature Grans, Absolute   Date Value Ref Range Status   10/13/2018 0.04 (H) 0.00 - 0.03 10*3/mm3 Final     nRBC   Date Value Ref Range Status   10/23/2019 0.0 0.0 - 0.2 /100 WBC Final   12/02/2016 0.0 0.0 - 0.0 /100 WBC Final       Lab Results   Component Value Date    HGBA1C 6.40 (H) 01/15/2021       No results  found for: LBSTGIVI27    No results found for: TSH    No results found for: CHOL  Lab Results   Component Value Date    TRIG 162 (H) 01/15/2021     Lab Results   Component Value Date    HDL 69 (H) 01/15/2021     Lab Results   Component Value Date     (H) 01/15/2021     Lab Results   Component Value Date    VLDL 30 01/15/2021     No results found for: LDLHDL      Procedures    Assessment/Plan   Problems Addressed this Visit     Cough - Primary    Laryngitis      Diagnoses       Codes Comments    Cough    -  Primary ICD-10-CM: R05.9  ICD-9-CM: 786.2     Laryngitis     ICD-10-CM: J04.0  ICD-9-CM: 464.00       Cough with fever.  New problem with systemic issues.  Multiple contacts with who have subsequently developed Covid.  Will check Covid status.  Treat for secondary bacterial causes with Z-Cezar.  Laryngitis.  New problem likely viral.  Will treat with p.o. steroids and antibiotics for bacterial coverage.  If worsening breathing to emergency room.  Patient voiced understanding.    No orders of the defined types were placed in this encounter.      Current Outpatient Medications   Medication Sig Dispense Refill   • albuterol sulfate  (90 Base) MCG/ACT inhaler Inhale 2 puffs Every 4 (Four) Hours As Needed for Wheezing. 18 g 0   • atorvastatin (LIPITOR) 20 MG tablet Take 1 tablet by mouth Daily. 90 tablet 3   • benzonatate (TESSALON) 100 MG capsule Take 1-2 capsules by mouth 3 (Three) Times a Day As Needed for Cough. 30 capsule 0   • mirtazapine (REMERON) 30 MG tablet Take 1 tablet by mouth Daily. 90 tablet 3   • predniSONE (DELTASONE) 50 MG tablet Take 1 tablet by mouth Daily With Breakfast. 5 tablet 0   • promethazine-dextromethorphan (PROMETHAZINE-DM) 6.25-15 MG/5ML syrup Take 5 mL by mouth At Night As Needed for Cough. 118 mL 0   • valACYclovir (Valtrex) 1000 MG tablet Take 2 tablets by mouth 2 (Two) Times a Day. Start with first sign of an outbreak. 4 tablet 5     No current facility-administered  medications for this visit.       Joelle Williamson had no medications administered during this visit.    No follow-ups on file.    There are no Patient Instructions on file for this visit.

## 2022-01-26 ENCOUNTER — TELEPHONE (OUTPATIENT)
Dept: FAMILY MEDICINE CLINIC | Facility: CLINIC | Age: 52
End: 2022-01-26

## 2022-01-26 NOTE — TELEPHONE ENCOUNTER
Caller: Joelle Williamson    Relationship: Self    Best call back number: [phone]      What was the call regarding: PATIENT SAW DR. CONTRERAS ON 1/5 AND WAS GIVEN STEROIDS AND ABX. SHE WAS FEELING BETTER BUT NOW SHE IS SICK AGAIN AND WONDERING IF SHE NEEDS ANOTHER ROUND OF MEDICATIONS. SHE ALSO HAS DIARRHEA WITH A LOW GRADE FEVER ALONG WITH THE OTHER SYMPTOMS SHE HAD BEFORE. SHE HAS LOST HER VOICE.     Do you require a callback: YES    [id number] - [place], [place] - [address] [place] [address] PK[address] - [phone] [facility] [phone]   [phone]

## 2022-01-31 ENCOUNTER — TELEPHONE (OUTPATIENT)
Dept: FAMILY MEDICINE CLINIC | Facility: CLINIC | Age: 52
End: 2022-01-31

## 2022-01-31 NOTE — TELEPHONE ENCOUNTER
vm box full....the patient will need an appointment before she can receive any medication     Ok for hub to read

## 2022-01-31 NOTE — TELEPHONE ENCOUNTER
Tried calling pt to let her know she would need an appointment before getting any medication. Vm box was full

## 2022-01-31 NOTE — TELEPHONE ENCOUNTER
PATIENT IS EXPERIENCING THE FOLLOWING SYMPTOMS:  HEADACHE  COUGH  TIGHTNESS IN CHEST DUE TO EXCESSIVE COUGHING AND SMOKING  COLD CHILLS  DIARRHEA  POSSIBLE LARYNGITIS       PATIENT HAS NOT BEEN TESTED FOR COVID.    PATIENT REQUESTING AN ANTIBIOTIC AND STEROID.    PLEASE ADVISE    PHARMACY: [id number] - [place], [place] - [address] - [phone]  - [phone] FX

## 2022-02-17 ENCOUNTER — TELEMEDICINE (OUTPATIENT)
Dept: FAMILY MEDICINE CLINIC | Facility: CLINIC | Age: 52
End: 2022-02-17

## 2022-02-17 DIAGNOSIS — J20.9 ACUTE BRONCHITIS, UNSPECIFIED ORGANISM: Primary | ICD-10-CM

## 2022-02-17 PROCEDURE — 99213 OFFICE O/P EST LOW 20 MIN: CPT | Performed by: NURSE PRACTITIONER

## 2022-02-17 RX ORDER — AMOXICILLIN AND CLAVULANATE POTASSIUM 875; 125 MG/1; MG/1
1 TABLET, FILM COATED ORAL 2 TIMES DAILY
Qty: 14 TABLET | Refills: 0 | Status: SHIPPED | OUTPATIENT
Start: 2022-02-17 | End: 2022-02-24

## 2022-02-17 RX ORDER — ALBUTEROL SULFATE 90 UG/1
2 AEROSOL, METERED RESPIRATORY (INHALATION) EVERY 6 HOURS PRN
Qty: 18 G | Refills: 0 | Status: SHIPPED | OUTPATIENT
Start: 2022-02-17

## 2022-02-17 RX ORDER — BROMPHENIRAMINE MALEATE, PSEUDOEPHEDRINE HYDROCHLORIDE, AND DEXTROMETHORPHAN HYDROBROMIDE 2; 30; 10 MG/5ML; MG/5ML; MG/5ML
10 SYRUP ORAL 4 TIMES DAILY PRN
Qty: 473 ML | Refills: 0 | Status: SHIPPED | OUTPATIENT
Start: 2022-02-17

## 2022-02-17 RX ORDER — DOXYCYCLINE 100 MG/1
100 CAPSULE ORAL 2 TIMES DAILY
Qty: 14 CAPSULE | Refills: 0 | Status: SHIPPED | OUTPATIENT
Start: 2022-02-17 | End: 2022-02-24

## 2022-02-17 NOTE — PROGRESS NOTES
Chief Complaint  Cough  You have chosen to receive care through a telehealth visit.  Do you consent to use a video/audio connection for your medical care today? Yes via [employer]   Michelle          Joelle Williamson presents to [facility]  Cough  The current episode started more than 1 month ago. The problem has been waxing and waning. The cough is productive of sputum. Associated symptoms include ear pain (right), a fever (99.7), headaches, nasal congestion, postnasal drip, a sore throat and shortness of breath. Risk factors for lung disease include smoking/tobacco exposure. Treatments tried: azithromycin and oral steroid. The treatment provided mild relief.     Objective   Vital Signs:   There were no vitals taken for this visit.    Physical Exam  Constitutional:       Appearance: Normal appearance.   Neurological:      Mental Status: She is alert.   Psychiatric:         Mood and Affect: Mood normal.        Result Review :            Assessment and Plan    Diagnoses and all orders for this visit:    1. Acute bronchitis, unspecified organism (Primary)  Comments:  - Patient declines Covid testing at this time.  Orders:  -     XR Chest PA & Lateral; Future  -     amoxicillin-clavulanate (Augmentin) 875-125 MG per tablet; Take 1 tablet by mouth 2 (Two) Times a Day for 7 days.  Dispense: 14 tablet; Refill: 0  -     doxycycline (MONODOX) 100 MG capsule; Take 1 capsule by mouth 2 (Two) Times a Day for 7 days.  Dispense: 14 capsule; Refill: 0  -     brompheniramine-pseudoephedrine-DM 30-2-10 MG/5ML syrup; Take 10 mL by mouth 4 (Four) Times a Day As Needed for Congestion or Cough.  Dispense: 473 mL; Refill: 0  -     albuterol sulfate  (90 Base) MCG/ACT inhaler; Inhale 2 puffs Every 6 (Six) Hours As Needed for Wheezing or Shortness of Air.  Dispense: 18 g; Refill: 0      I spent 20 minutes caring for Joelle on this date of service. This time includes time spent by me in the following  activities:counseling and educating the patient/family/caregiver, ordering medications, tests, or procedures and documenting information in the medical record  Follow Up   Return if symptoms worsen or fail to improve.  Patient was given instructions and counseling regarding her condition or for health maintenance advice. Please see specific information pulled into the AVS if appropriate.

## 2022-04-19 ENCOUNTER — TELEPHONE (OUTPATIENT)
Dept: FAMILY MEDICINE CLINIC | Facility: CLINIC | Age: 52
End: 2022-04-19

## 2024-09-20 ENCOUNTER — OFFICE VISIT (OUTPATIENT)
Dept: FAMILY MEDICINE CLINIC | Facility: CLINIC | Age: 54
End: 2024-09-20
Payer: MEDICAID

## 2024-09-20 VITALS
HEART RATE: 102 BPM | TEMPERATURE: 98.1 F | OXYGEN SATURATION: 97 % | BODY MASS INDEX: 26.12 KG/M2 | DIASTOLIC BLOOD PRESSURE: 90 MMHG | SYSTOLIC BLOOD PRESSURE: 142 MMHG | WEIGHT: 153 LBS | HEIGHT: 64 IN

## 2024-09-20 DIAGNOSIS — R03.0 ELEVATED BP WITHOUT DIAGNOSIS OF HYPERTENSION: ICD-10-CM

## 2024-09-20 DIAGNOSIS — R21 RASH OF FACE: ICD-10-CM

## 2024-09-20 DIAGNOSIS — J02.9 SORE THROAT: ICD-10-CM

## 2024-09-20 DIAGNOSIS — F41.8 DEPRESSION WITH ANXIETY: ICD-10-CM

## 2024-09-20 DIAGNOSIS — Z11.3 ROUTINE SCREENING FOR STI (SEXUALLY TRANSMITTED INFECTION): ICD-10-CM

## 2024-09-20 DIAGNOSIS — N76.0 ACUTE VAGINITIS: ICD-10-CM

## 2024-09-20 DIAGNOSIS — N89.8 VAGINAL LESION: Primary | ICD-10-CM

## 2024-09-20 LAB
EXPIRATION DATE: NORMAL
INTERNAL CONTROL: NORMAL
Lab: NORMAL
S PYO AG THROAT QL: NEGATIVE

## 2024-09-20 RX ORDER — VALACYCLOVIR HYDROCHLORIDE 1 G/1
1000 TABLET, FILM COATED ORAL 2 TIMES DAILY
Qty: 20 TABLET | Refills: 0 | Status: SHIPPED | OUTPATIENT
Start: 2024-09-20 | End: 2024-09-30

## 2024-09-20 RX ORDER — KETOCONAZOLE 20 MG/G
1 CREAM TOPICAL DAILY
Qty: 30 G | Refills: 3 | Status: SHIPPED | OUTPATIENT
Start: 2024-09-20

## 2024-09-20 RX ORDER — FLUCONAZOLE 200 MG/1
200 TABLET ORAL DAILY
Qty: 1 TABLET | Refills: 0 | Status: SHIPPED | OUTPATIENT
Start: 2024-09-20

## 2024-09-20 RX ORDER — PERMETHRIN 50 MG/G
1 CREAM TOPICAL ONCE
Qty: 1 G | Refills: 0 | Status: CANCELLED | OUTPATIENT
Start: 2024-09-20 | End: 2024-09-20

## 2024-09-22 PROBLEM — J02.9 SORE THROAT: Status: ACTIVE | Noted: 2024-09-22

## 2024-09-22 PROBLEM — N89.8 VAGINAL LESION: Status: ACTIVE | Noted: 2024-09-22

## 2024-09-22 PROBLEM — N76.0 ACUTE VAGINITIS: Status: ACTIVE | Noted: 2024-09-22

## 2024-09-22 PROBLEM — R03.0 ELEVATED BP WITHOUT DIAGNOSIS OF HYPERTENSION: Status: ACTIVE | Noted: 2024-09-22

## 2024-09-22 PROBLEM — R21 RASH OF FACE: Status: ACTIVE | Noted: 2024-09-22

## 2024-09-22 PROBLEM — Z11.3 ROUTINE SCREENING FOR STI (SEXUALLY TRANSMITTED INFECTION): Status: ACTIVE | Noted: 2024-09-22

## 2024-09-24 LAB
A VAGINAE DNA VAG QL NAA+PROBE: ABNORMAL SCORE
BVAB2 DNA VAG QL NAA+PROBE: ABNORMAL SCORE
C ALBICANS DNA VAG QL NAA+PROBE: NEGATIVE
C GLABRATA DNA VAG QL NAA+PROBE: NEGATIVE
C TRACH DNA SPEC QL NAA+PROBE: NEGATIVE
HAV IGM SERPL QL IA: NEGATIVE
HBV CORE IGM SERPL QL IA: NEGATIVE
HBV SURFACE AG SERPL QL IA: NEGATIVE
HCV AB SERPL QL IA: NORMAL
HCV IGG SERPL QL IA: NON REACTIVE
HCV RNA SERPL NAA+PROBE-ACNC: NORMAL IU/ML
HIV 1+2 AB+HIV1 P24 AG SERPL QL IA: NON REACTIVE
HSV1 DNA SPEC QL NAA+PROBE: NEGATIVE
HSV1 IGG SER IA-ACNC: 20.5 INDEX (ref 0–0.9)
HSV2 DNA SPEC QL NAA+PROBE: NEGATIVE
HSV2 IGG SER IA-ACNC: <0.91 INDEX (ref 0–0.9)
MEGA1 DNA VAG QL NAA+PROBE: ABNORMAL SCORE
N GONORRHOEA DNA VAG QL NAA+PROBE: NEGATIVE
REF LAB TEST REF RANGE: NORMAL
RPR SER QL: NON REACTIVE
T VAGINALIS DNA VAG QL NAA+PROBE: NEGATIVE